# Patient Record
Sex: MALE | Race: WHITE
[De-identification: names, ages, dates, MRNs, and addresses within clinical notes are randomized per-mention and may not be internally consistent; named-entity substitution may affect disease eponyms.]

---

## 2017-04-12 ENCOUNTER — HOSPITAL ENCOUNTER (OUTPATIENT)
Dept: HOSPITAL 47 - RADCTMAIN | Age: 57
Discharge: HOME | End: 2017-04-12
Payer: COMMERCIAL

## 2017-04-12 DIAGNOSIS — J84.10: Primary | ICD-10-CM

## 2017-04-12 PROCEDURE — 71250 CT THORAX DX C-: CPT

## 2017-04-12 NOTE — CT
EXAMINATION TYPE: CT chest wo con

 

DATE OF EXAM: 4/12/2017 7:50 AM

 

COMPARISON: CT chest July 8, 2016. Older CT December 1, 2015.

 

HISTORY: Prior abnormal CT. Known fibrosis.

 

CT DLP: 770 mGycm.  Automated Exposure Control for Dose Reduction was Utilized.

 

 

TECHNIQUE:  CT scan of the thorax is performed without IV contrast.

 

FINDINGS:

 

LUNGS: There is subpleural fibrosis and reticulation seen bilaterally and diffusely involving upper a
nd lower lungs. Some honeycombing along the periphery is present. Overall I see no significant progre
ssion from December 2015 exam. No suspicious groundglass opacity or consolidation is seen to suggest 
acute process. No pleural effusion or pneumothorax is noted bilaterally. No concerning parenchymal no
dule or mass is identified. No significant bronchiectasis is present.

 

MEDIASTINUM: Lack of IV contrast is noted to limit evaluation for mediastinal and especially hilar ad
enopathy. There are no definitive greater than 1 cm hilar or mediastinal lymph nodes.   No cardiomega
ly or pericardial effusion is seen. Main pulmonary artery appears prominent at 3.7 cm on axial image 
18 but stable from prior. Adjacent ascending aorta measures 3.5 cm in diameter. CT finding is consist
ent with underlying pulmonary artery hypertension.

 

OTHER: Liver is diffusely low dense consistent with fatty infiltration. Mild to moderate multilevel a
nterior and lateral spurring is present. There is vacuum disc phenomenon in the lower thoracic levels
 noted.

 

IMPRESSION: Bilateral interstitial fibrosis redemonstrated involving upper and lower lungs raises con
cern for IPF. No significant progression from December 2015. No acute pulmonary process identified.

## 2018-04-09 ENCOUNTER — HOSPITAL ENCOUNTER (OUTPATIENT)
Dept: HOSPITAL 47 - RADCTMAIN | Age: 58
Discharge: HOME | End: 2018-04-09
Payer: COMMERCIAL

## 2018-04-09 DIAGNOSIS — J84.9: Primary | ICD-10-CM

## 2018-04-09 PROCEDURE — 71250 CT THORAX DX C-: CPT

## 2018-04-09 NOTE — CT
EXAMINATION TYPE: CT chest wo con

 

DATE OF EXAM: 4/9/2018

 

COMPARISON: Prior CT chest 4/12/2017

 

HISTORY: Interstitial pulmonary disease

 

CT DLP: 684.6 mGycm.  Automated Exposure Control for Dose Reduction was Utilized.

 

 

TECHNIQUE:  CT scan of the thorax is performed without IV contrast.

 

FINDINGS: Lack of contrast could compromise sensitivity.

 

The pulmonary artery is dilated similar to prior exam.

 

LUNGS: The lungs are similar in appearance, thickening of interlobular septa again noted bilaterally,
 some minimal honeycombing suspected at the lung bases, thickened septal lines are present, there is 
no concerning parenchymal mass or nodule identified. Findings are largely peripheral, subpleural and 
bilateral. Distribution is somewhat patchy, some areas of groundglass opacity also present. There is 
no pleural effusion or pneumothorax seen.  The tracheobronchial tree is patent.

 

MEDIASTINUM: Lack of IV contrast is noted to limit evaluation for mediastinal and especially hilar ad
enopathy. Retrocaval pretracheal lymph node shows a short axis measurement of 12 to 13 mm similar to 
prior exam.

 

OTHER:  No additional significant interval change is seen.

 

IMPRESSION: Findings compatible with idiopathic pulmonary fibrosis, similar to prior exam. Correlate 
for pulmonary artery hypertension.

## 2019-01-04 ENCOUNTER — HOSPITAL ENCOUNTER (OUTPATIENT)
Dept: HOSPITAL 47 - LABWHC1 | Age: 59
Discharge: HOME | End: 2019-01-04
Attending: INTERNAL MEDICINE
Payer: COMMERCIAL

## 2019-01-04 DIAGNOSIS — J84.9: Primary | ICD-10-CM

## 2019-01-04 LAB
A ALTERNATA IGE QN: <0.1 KU/L
CAT DANDER IGE QN: <0.1 KU/L
COMMON RAGWEED IGE QN: <0.1 KU/L
D FARINAE IGE QN: 0.55 KU/L
DSDNA AB SER QL: NEGATIVE
DSDNA AB TITR SER: <1 IU/ML
RED OAK IGE QN: <0.1 KU/L
RED TOP GRASS IGE QN: <0.1 KU/L
RNP: <0.2 AI

## 2019-01-04 PROCEDURE — 86235 NUCLEAR ANTIGEN ANTIBODY: CPT

## 2019-01-04 PROCEDURE — 86225 DNA ANTIBODY NATIVE: CPT

## 2019-01-04 PROCEDURE — 36415 COLL VENOUS BLD VENIPUNCTURE: CPT

## 2019-01-04 PROCEDURE — 86003 ALLG SPEC IGE CRUDE XTRC EA: CPT

## 2019-01-04 PROCEDURE — 86038 ANTINUCLEAR ANTIBODIES: CPT

## 2019-01-04 PROCEDURE — 82785 ASSAY OF IGE: CPT

## 2019-01-04 PROCEDURE — 86431 RHEUMATOID FACTOR QUANT: CPT

## 2020-01-31 ENCOUNTER — HOSPITAL ENCOUNTER (OUTPATIENT)
Dept: HOSPITAL 47 - RADUSWWP | Age: 60
Discharge: HOME | End: 2020-01-31
Attending: FAMILY MEDICINE
Payer: COMMERCIAL

## 2020-01-31 DIAGNOSIS — K22.8: ICD-10-CM

## 2020-01-31 DIAGNOSIS — K21.9: Primary | ICD-10-CM

## 2020-01-31 DIAGNOSIS — K20.9: ICD-10-CM

## 2020-01-31 PROCEDURE — 74220 X-RAY XM ESOPHAGUS 1CNTRST: CPT

## 2020-02-13 ENCOUNTER — HOSPITAL ENCOUNTER (OUTPATIENT)
Dept: HOSPITAL 47 - ORWHC2ENDO | Age: 60
Discharge: HOME | End: 2020-02-13
Attending: INTERNAL MEDICINE
Payer: COMMERCIAL

## 2020-02-13 VITALS — HEART RATE: 69 BPM | SYSTOLIC BLOOD PRESSURE: 107 MMHG | DIASTOLIC BLOOD PRESSURE: 72 MMHG

## 2020-02-13 VITALS — RESPIRATION RATE: 16 BRPM | TEMPERATURE: 98.6 F

## 2020-02-13 VITALS — BODY MASS INDEX: 31.4 KG/M2

## 2020-02-13 DIAGNOSIS — Z76.82: ICD-10-CM

## 2020-02-13 DIAGNOSIS — K21.0: Primary | ICD-10-CM

## 2020-02-13 DIAGNOSIS — Z79.51: ICD-10-CM

## 2020-02-13 DIAGNOSIS — Z98.890: ICD-10-CM

## 2020-02-13 DIAGNOSIS — K29.50: ICD-10-CM

## 2020-02-13 DIAGNOSIS — Z79.899: ICD-10-CM

## 2020-02-13 DIAGNOSIS — I10: ICD-10-CM

## 2020-02-13 DIAGNOSIS — Z87.891: ICD-10-CM

## 2020-02-13 DIAGNOSIS — Z79.82: ICD-10-CM

## 2020-02-13 DIAGNOSIS — J84.10: ICD-10-CM

## 2020-02-13 DIAGNOSIS — E78.5: ICD-10-CM

## 2020-02-13 LAB
ALBUMIN SERPL-MCNC: 4.8 G/DL (ref 3.5–5)
ALP SERPL-CCNC: 80 U/L (ref 38–126)
ALT SERPL-CCNC: 19 U/L (ref 4–49)
AST SERPL-CCNC: 36 U/L (ref 17–59)
BILIRUB INDIRECT SERPL-MCNC: 0.6 MG/DL (ref 0–1.1)
BILIRUBIN DIRECT+TOT PNL SERPL-MCNC: 0.4 MG/DL (ref 0–0.2)
PROT SERPL-MCNC: 8.3 G/DL (ref 6.3–8.2)

## 2020-02-13 PROCEDURE — 43239 EGD BIOPSY SINGLE/MULTIPLE: CPT

## 2020-02-13 PROCEDURE — 88305 TISSUE EXAM BY PATHOLOGIST: CPT

## 2020-02-13 PROCEDURE — 80076 HEPATIC FUNCTION PANEL: CPT

## 2020-02-13 NOTE — P.PCN
Date of Procedure: 02/13/20


Description of Procedure: 





BRIEF HISTORY: 


Patient is a pleasant 59-year-old male who presents for outpatient 

esophagogastroduodenoscopy for evaluation of wall thickening of the esophagus 

seen on imaging and GERD.  Patient reports a history of GERD controlled with 

omeprazole daily.  However, he states that after starting his new medication for

pulmonary fibrosis reflux has worsened.  He reports that this is a known side 

effect of the medication.  Omeprazole was increased to twice daily and he 

reports his symptoms are now improved.





PROCEDURE PERFORMED: 


Esophagogastroduodenoscopy with biopsy.





PREOPERATIVE DIAGNOSIS: 


GERD, wall thickening of the esophagus, abnormal imaging esophagus. 





ESTIMATED BLOOD LOSS: 


Minimal.





IV sedation per anesthesia. 





PROCEDURE: 


After informed consent was obtained, the patient  was brought into the endoscopy

unit. IV sedation was administered by Anesthesia under continuous monitoring. 

Initially the Olympus GIF-190 video endoscope was inserted into the mouth. 

Esophagus intubated without any difficulty. It was gradually advanced into the 

stomach and duodenum and carefully examined. The bulb and the second part of the

duodenum appeared normal, with biopsies taken to rule out celiac sprue. The 

scope at this time was withdrawn to the stomach, adequately insufflated with 

air, and upon careful examination, mucosa of the antrum, body, cardia and the 

fundus appeared normal, except for some mild punctate erythema in the antrum and

body of the stomach suggestive of mild gastritis with biopsies taken. The scope 

was then withdrawn into the esophagus. The GE junction was located at 38 cm from

the incisors with biopsies of the GE junction taken. The esophagus appeared 

normal, with biopsies of the midesophagus taken in the setting of abnormal 

imaging of the esophagus. There were no erosions or ulcerations seen and the 

patient tolerated the procedure well. 





IMPRESSION: 


1.  Gastritis antrum and body, biopsied.


2.  Biopsies of the duodenum, GE junction and mid esophagus.





RECOMMENDATIONS: 


The findings of this examination were discussed with the patient and his wife.  

Okay to resume diet.  Okay to resume medications.  Await pathology from 

biopsies.  Continue omeprazole twice daily.

## 2020-06-03 ENCOUNTER — HOSPITAL ENCOUNTER (OUTPATIENT)
Dept: HOSPITAL 47 - LABWHC1 | Age: 60
Discharge: HOME | End: 2020-06-03
Attending: INTERNAL MEDICINE
Payer: COMMERCIAL

## 2020-06-03 DIAGNOSIS — J84.9: Primary | ICD-10-CM

## 2020-06-03 DIAGNOSIS — J84.112: ICD-10-CM

## 2020-06-03 LAB
ALBUMIN SERPL-MCNC: 4.4 G/DL (ref 3.8–4.9)
ALBUMIN/GLOB SERPL: 1.83 G/DL (ref 1.6–3.17)
ALP SERPL-CCNC: 97 U/L (ref 41–126)
ALT SERPL-CCNC: 18 U/L (ref 10–49)
AST SERPL-CCNC: 30 U/L (ref 14–35)
BILIRUB INDIRECT SERPL-MCNC: 0.4 MG/DL
GLOBULIN SER CALC-MCNC: 2.4 G/DL (ref 1.6–3.3)
PROT SERPL-MCNC: 6.8 G/DL (ref 6.2–8.2)

## 2020-06-03 PROCEDURE — 80076 HEPATIC FUNCTION PANEL: CPT

## 2020-06-03 PROCEDURE — 36415 COLL VENOUS BLD VENIPUNCTURE: CPT

## 2020-07-02 ENCOUNTER — HOSPITAL ENCOUNTER (OUTPATIENT)
Dept: HOSPITAL 47 - LABWHC1 | Age: 60
Discharge: HOME | End: 2020-07-02
Attending: INTERNAL MEDICINE
Payer: COMMERCIAL

## 2020-07-02 DIAGNOSIS — J84.112: Primary | ICD-10-CM

## 2020-09-11 ENCOUNTER — HOSPITAL ENCOUNTER (OUTPATIENT)
Dept: HOSPITAL 47 - LABWHC1 | Age: 60
Discharge: HOME | End: 2020-09-11
Attending: INTERNAL MEDICINE
Payer: COMMERCIAL

## 2020-09-11 DIAGNOSIS — I25.10: ICD-10-CM

## 2020-09-11 DIAGNOSIS — Z01.818: Primary | ICD-10-CM

## 2020-09-11 LAB
ANION GAP SERPL CALC-SCNC: 9 MMOL/L
BUN SERPL-SCNC: 20 MG/DL (ref 9–20)
CALCIUM SPEC-MCNC: 9.6 MG/DL (ref 8.4–10.2)
CHLORIDE SERPL-SCNC: 105 MMOL/L (ref 98–107)
CO2 SERPL-SCNC: 27 MMOL/L (ref 22–30)
ERYTHROCYTE [DISTWIDTH] IN BLOOD BY AUTOMATED COUNT: 4.84 M/UL (ref 4.3–5.9)
ERYTHROCYTE [DISTWIDTH] IN BLOOD: 13.4 % (ref 11.5–15.5)
GLUCOSE SERPL-MCNC: 91 MG/DL (ref 74–99)
HCT VFR BLD AUTO: 45 % (ref 39–53)
HGB BLD-MCNC: 14.6 GM/DL (ref 13–17.5)
INR PPP: 1 (ref ?–1.2)
MCH RBC QN AUTO: 30.3 PG (ref 25–35)
MCHC RBC AUTO-ENTMCNC: 32.6 G/DL (ref 31–37)
MCV RBC AUTO: 93 FL (ref 80–100)
PLATELET # BLD AUTO: 236 K/UL (ref 150–450)
POTASSIUM SERPL-SCNC: 4.4 MMOL/L (ref 3.5–5.1)
PT BLD: 10.6 SEC (ref 9–12)
SODIUM SERPL-SCNC: 141 MMOL/L (ref 137–145)
WBC # BLD AUTO: 6.8 K/UL (ref 3.8–10.6)

## 2020-09-11 PROCEDURE — 85610 PROTHROMBIN TIME: CPT

## 2020-09-11 PROCEDURE — 36415 COLL VENOUS BLD VENIPUNCTURE: CPT

## 2020-09-11 PROCEDURE — 85027 COMPLETE CBC AUTOMATED: CPT

## 2020-09-11 PROCEDURE — 80048 BASIC METABOLIC PNL TOTAL CA: CPT

## 2020-10-02 ENCOUNTER — HOSPITAL ENCOUNTER (OUTPATIENT)
Dept: HOSPITAL 47 - ORWHC2ENDO | Age: 60
Discharge: HOME | End: 2020-10-02
Attending: INTERNAL MEDICINE
Payer: COMMERCIAL

## 2020-10-02 VITALS — BODY MASS INDEX: 29.1 KG/M2

## 2020-10-02 VITALS — DIASTOLIC BLOOD PRESSURE: 69 MMHG | SYSTOLIC BLOOD PRESSURE: 115 MMHG

## 2020-10-02 VITALS — TEMPERATURE: 97 F

## 2020-10-02 VITALS — HEART RATE: 80 BPM | RESPIRATION RATE: 18 BRPM

## 2020-10-02 DIAGNOSIS — K21.9: ICD-10-CM

## 2020-10-02 DIAGNOSIS — Z12.11: Primary | ICD-10-CM

## 2020-10-02 DIAGNOSIS — J84.112: ICD-10-CM

## 2020-10-02 DIAGNOSIS — Z98.890: ICD-10-CM

## 2020-10-02 DIAGNOSIS — Z87.19: ICD-10-CM

## 2020-10-02 DIAGNOSIS — Z79.899: ICD-10-CM

## 2020-10-02 DIAGNOSIS — Z79.51: ICD-10-CM

## 2020-10-02 PROCEDURE — 45378 DIAGNOSTIC COLONOSCOPY: CPT

## 2020-10-02 NOTE — P.PCN
Date of Procedure: 10/02/20


Procedure(s) Performed: 


BRIEF HISTORY: Patient is a 59-year-old pleasant white male scheduled for an 

elective colonoscopy as a part of screening for colorectal neoplasia


PROCEDURE PERFORMED: Colonoscopy. 





PREOPERATIVE DIAGNOSIS:  Screening for colon cancer. 





IV sedation per Anesthesia. 





PROCEDURE: After informed consent was obtained, the patient, was brought into 

the endoscopy unit. IV sedation was administered by Anesthesia under continuous 

monitoring.  Digital rectal examination was normal. Initially the Olympus CF-160

flexible video colonoscope was then inserted in the rectum, gradually advanced 

into the cecum without any difficulty. Careful examination was performed as the 

scope was gradually being withdrawn. Ileocecal valve and the appendiceal orifice

were visualized and appeared normal.  Prep was fair.. Mucosa of the cecum, 

ascending colon, transverse colon, descending colon, sigmoid colon, and rectum 

appeared normal. Retroflexion was performed in the rectum and no lesions were 

seen. The patient tolerated the procedure well. 





IMPRESSION: 


Normal-appearing colon from rectum to cecum with no evidence of colorectal 

neoplasia .





RECOMMENDATIONS:  Findings of this examination were discussed with the patient  

as well as his family. he was advised to have a repeat screening colonoscopy in 

10 years.

## 2020-12-21 ENCOUNTER — HOSPITAL ENCOUNTER (INPATIENT)
Dept: HOSPITAL 47 - EC | Age: 60
LOS: 2 days | Discharge: HOME | DRG: 683 | End: 2020-12-23
Payer: COMMERCIAL

## 2020-12-21 DIAGNOSIS — Z79.82: ICD-10-CM

## 2020-12-21 DIAGNOSIS — Z87.891: ICD-10-CM

## 2020-12-21 DIAGNOSIS — J84.112: ICD-10-CM

## 2020-12-21 DIAGNOSIS — E78.5: ICD-10-CM

## 2020-12-21 DIAGNOSIS — N17.9: Primary | ICD-10-CM

## 2020-12-21 DIAGNOSIS — Z79.52: ICD-10-CM

## 2020-12-21 DIAGNOSIS — E86.1: ICD-10-CM

## 2020-12-21 DIAGNOSIS — E87.1: ICD-10-CM

## 2020-12-21 DIAGNOSIS — K21.9: ICD-10-CM

## 2020-12-21 DIAGNOSIS — N18.32: ICD-10-CM

## 2020-12-21 DIAGNOSIS — Z79.899: ICD-10-CM

## 2020-12-21 DIAGNOSIS — T36.8X5A: ICD-10-CM

## 2020-12-21 DIAGNOSIS — Z98.890: ICD-10-CM

## 2020-12-21 DIAGNOSIS — I12.9: ICD-10-CM

## 2020-12-21 DIAGNOSIS — Z94.2: ICD-10-CM

## 2020-12-21 DIAGNOSIS — E87.5: ICD-10-CM

## 2020-12-21 LAB
ANION GAP SERPL CALC-SCNC: 10 MMOL/L
BASOPHILS # BLD AUTO: 0 K/UL (ref 0–0.2)
BASOPHILS NFR BLD AUTO: 0 %
BUN SERPL-SCNC: 43 MG/DL (ref 9–20)
CALCIUM SPEC-MCNC: 9.4 MG/DL (ref 8.4–10.2)
CHLORIDE SERPL-SCNC: 95 MMOL/L (ref 98–107)
CO2 SERPL-SCNC: 24 MMOL/L (ref 22–30)
EOSINOPHIL # BLD AUTO: 0.1 K/UL (ref 0–0.7)
EOSINOPHIL NFR BLD AUTO: 1 %
ERYTHROCYTE [DISTWIDTH] IN BLOOD BY AUTOMATED COUNT: 3.13 M/UL (ref 4.3–5.9)
ERYTHROCYTE [DISTWIDTH] IN BLOOD: 14.2 % (ref 11.5–15.5)
GLUCOSE SERPL-MCNC: 142 MG/DL (ref 74–99)
HCT VFR BLD AUTO: 29.7 % (ref 39–53)
HGB BLD-MCNC: 10 GM/DL (ref 13–17.5)
LYMPHOCYTES # SPEC AUTO: 0.4 K/UL (ref 1–4.8)
LYMPHOCYTES NFR SPEC AUTO: 3 %
MAGNESIUM SPEC-SCNC: 2.2 MG/DL (ref 1.6–2.3)
MCH RBC QN AUTO: 32 PG (ref 25–35)
MCHC RBC AUTO-ENTMCNC: 33.7 G/DL (ref 31–37)
MCV RBC AUTO: 95.1 FL (ref 80–100)
MONOCYTES # BLD AUTO: 0.2 K/UL (ref 0–1)
MONOCYTES NFR BLD AUTO: 1 %
NEUTROPHILS # BLD AUTO: 10.6 K/UL (ref 1.3–7.7)
NEUTROPHILS NFR BLD AUTO: 94 %
PH UR: 5 [PH] (ref 5–8)
PLATELET # BLD AUTO: 372 K/UL (ref 150–450)
POTASSIUM SERPL-SCNC: 6.6 MMOL/L (ref 3.5–5.1)
SODIUM SERPL-SCNC: 129 MMOL/L (ref 137–145)
SP GR UR: 1.01 (ref 1–1.03)
UROBILINOGEN UR QL STRIP: <2 MG/DL (ref ?–2)
WBC # BLD AUTO: 11.2 K/UL (ref 3.8–10.6)

## 2020-12-21 PROCEDURE — 96374 THER/PROPH/DIAG INJ IV PUSH: CPT

## 2020-12-21 PROCEDURE — 81003 URINALYSIS AUTO W/O SCOPE: CPT

## 2020-12-21 PROCEDURE — 80053 COMPREHEN METABOLIC PANEL: CPT

## 2020-12-21 PROCEDURE — 80197 ASSAY OF TACROLIMUS: CPT

## 2020-12-21 PROCEDURE — 96361 HYDRATE IV INFUSION ADD-ON: CPT

## 2020-12-21 PROCEDURE — 83735 ASSAY OF MAGNESIUM: CPT

## 2020-12-21 PROCEDURE — 82955 ASSAY OF G6PD ENZYME: CPT

## 2020-12-21 PROCEDURE — 80048 BASIC METABOLIC PNL TOTAL CA: CPT

## 2020-12-21 PROCEDURE — 76937 US GUIDE VASCULAR ACCESS: CPT

## 2020-12-21 PROCEDURE — 93005 ELECTROCARDIOGRAM TRACING: CPT

## 2020-12-21 PROCEDURE — 99285 EMERGENCY DEPT VISIT HI MDM: CPT

## 2020-12-21 PROCEDURE — 84132 ASSAY OF SERUM POTASSIUM: CPT

## 2020-12-21 PROCEDURE — 84100 ASSAY OF PHOSPHORUS: CPT

## 2020-12-21 PROCEDURE — 36410 VNPNXR 3YR/> PHY/QHP DX/THER: CPT

## 2020-12-21 PROCEDURE — 96375 TX/PRO/DX INJ NEW DRUG ADDON: CPT

## 2020-12-21 PROCEDURE — 85025 COMPLETE CBC W/AUTO DIFF WBC: CPT

## 2020-12-21 PROCEDURE — 36415 COLL VENOUS BLD VENIPUNCTURE: CPT

## 2020-12-21 RX ADMIN — SENNOSIDES SCH MG: 8.6 TABLET, FILM COATED ORAL at 20:37

## 2020-12-21 RX ADMIN — SODIUM POLYSTYRENE SULFONATE SCH GM: 15 SUSPENSION ORAL; RECTAL at 21:26

## 2020-12-21 RX ADMIN — ACETAMINOPHEN SCH MG: 500 TABLET ORAL at 20:36

## 2020-12-21 RX ADMIN — DOCUSATE SODIUM SCH MG: 100 CAPSULE, LIQUID FILLED ORAL at 20:37

## 2020-12-21 RX ADMIN — NYSTATIN SCH UNIT: 100000 SUSPENSION ORAL at 20:38

## 2020-12-21 RX ADMIN — GABAPENTIN SCH MG: 300 CAPSULE ORAL at 20:37

## 2020-12-21 RX ADMIN — SODIUM POLYSTYRENE SULFONATE SCH GM: 15 SUSPENSION ORAL; RECTAL at 15:14

## 2020-12-21 RX ADMIN — CEFAZOLIN SCH MLS/HR: 330 INJECTION, POWDER, FOR SOLUTION INTRAMUSCULAR; INTRAVENOUS at 15:16

## 2020-12-21 RX ADMIN — TACROLIMUS SCH MG: 1 CAPSULE ORAL at 20:35

## 2020-12-21 RX ADMIN — ATORVASTATIN CALCIUM SCH MG: 20 TABLET, FILM COATED ORAL at 20:35

## 2020-12-21 NOTE — ED
General Adult HPI





- General


Chief complaint: Recheck/Abnormal Lab/Rx


Stated complaint: abn labs


Time Seen by Provider: 12/21/20 13:39


Source: patient


Mode of arrival: ambulatory


Limitations: no limitations





- History of Present Illness


Initial comments: 


Dictation was produced using dragon dictation software. please excuse any 

grammatical, word or spelling errors. 





This patient was cared for during a federal and state declared state of 

emergency secondary to Covid 19





Chief Complaint: 60-year-old male with abnormal outpatient lab





History of Present Illness: Is a 60-year-old male he is 2 weeks postop from 

bilateral lung transplant performed at [facility].  Patient states 

he had the procedure done 2 weeks ago.  Patient has history of idiopathic 

pulmonary fibrosis.  Patient states that his postoperative course was benign.  

He has been at home recovering.  He has home health care nurse that checks on 

him regularly.  He had some labs drawn on potassium of 6.2.  Patient states he's

been recovering well.  He does have some mild pain in his chest and back from 

the surgery.  Otherwise she feels well.  Denies any paresthesias or weakness.








The ROS documented in this emergency department record has been reviewed and co

nfirmed by me.  Those systems with pertinent positive or negative responses have

been documented in the HPI.  All other systems are other negative and/or 

noncontributory.








PHYSICAL EXAM:


General Impression: Alert and oriented x3, not in acute distress


HEENT: Normocephalic atraumatic, extra-ocular movements intact, pupils equal and

reactive to light bilaterally, mucous membranes moist.


Cardiovascular: Heart regular rate and rhythm


Chest: Able to complete full sentences, no retractions, no tachypnea, surgical 

sites clean dry and intact


Abdomen: abdomen soft, non-tender, non-distended, no organomegaly


Musculoskeletal: Pulses present and equal in all extremities, no peripheral 

edema


Motor:  no focal deficits noted


Neurological: CN II-XII grossly intact, no focal motor or sensory deficits noted


Skin: Intact with no visualized rashes


Psych: Normal affect and mood





ED course: 60-year-old male presents with potassium of 6.2 drawn on blood tests 

from earlier today.  He is 2 weeks postop from bilateral lung transplant.  Vital

signs upon arrival are within acceptable limits.  Patient is well-appearing at 

bedside.  EKG does not show any changes to suggest hyperkalemia.





EKG interpretation: Ventricular rate 89, normal sinus rhythm,.  Interval 160, 

QRS 92, . No HI prolongation, no QTC prolongation, no ST or T-wave 

changes noted.    Overall, this EKG is unremarkable





Repeat labs were obtained.  Hemoglobin 10.0.  This is probably cyst baseline 

from surgery.  Sodium is 129.  Patient given intravenous fluids.  Discussed him 

6.6 which is consistent with that of a potassium from earlier.  He is touch of 

acute kidney.  Creatinine of 2.07.  Urinalysis unremarkable.  Discussed patient 

case with Dr. Aburto patient's pulmonologist at [facility] at 

233pm.  He is agreeable with patient being admitted to our hospital for 

potassium control.  Case is discussed with Dr. Gatica who requested nephrology 

consultation.  Patient given hyperkalemia cocktail.








- Related Data


                                Home Medications











 Medication  Instructions  Recorded  Confirmed


 


Ascorbic Acid [Vitamin C 250 mg 500 mg PO DAILY 02/11/20 10/02/20





Tablet Chew]   


 


Aspirin 81 mg PO DAILY 02/11/20 10/02/20


 


Atorvastatin [Lipitor] 20 mg PO HS 02/11/20 10/02/20


 


Cholecalciferol [Vitamin D3] 400 unit PO DAILY 02/11/20 10/02/20


 


Cyanocobalamin (Vitamin B-12) 2,000 mcg PO DAILY 02/11/20 10/02/20





[Vitamin B-12]   


 


Fish Oil/Dha/Epa [Fish Oil 1,200 1 each PO DAILY 02/11/20 10/02/20





mg Fish Oil]   


 


Fluticasone/Umeclidin/Vilanter 1 inhalation INHALATION QAM 02/11/20 10/02/20





[Trelegy Ellipta 100-62.5-25]   


 


Gabapentin [Neurontin] 600 mg PO HS 02/11/20 10/02/20


 


Losartan Potassium [Cozaar] 100 mg PO HS 02/11/20 10/02/20


 


Magnesium 500 mg PO DAILY 02/11/20 10/02/20


 


Multivit-Min/FA/Lycopen/Lutein 1 each PO DAILY 02/11/20 10/02/20





[Centrum Silver Men Tablet]   


 


Omeprazole 20 mg PO BID 02/11/20 10/02/20


 


Pirfenidone [Esbriet] 801 mg PO 0730,1400,2000 02/11/20 10/02/20











                                    Allergies











Allergy/AdvReac Type Severity Reaction Status Date / Time


 


No Known Allergies Allergy   Verified 12/21/20 12:56














Review of Systems


ROS Statement: 


Those systems with pertinent positive or pertinent negative responses have been 

documented in the HPI.





ROS Other: All systems not noted in ROS Statement are negative.





Past Medical History


Past Medical History: GERD/Reflux, Hyperlipidemia, Hypertension


Additional Past Medical History / Comment(s): pt states "severe reflux and 

possible ulcerations shown on recent barium swallow",pulmonary fibrosis-possible

 transplant canidate-following with Dr at [facility]-uses O2 at 2L NC at hs and 

prn.Hx of jaw locking open on rt side spontaneously with need for anesthesia to 

close mult times.


History of Any Multi-Drug Resistant Organisms: None Reported


Past Surgical History: Hernia Repair, Orthopedic Surgery


Additional Past Surgical History / Comment(s): lazy eye surgery,arthroscopy to 

knee,umbilical hernia repair w/ mesh, [place]ito lung transplant


Past Anesthesia/Blood Transfusion Reactions: No Reported Reaction


Past Psychological History: No Psychological Hx Reported


Smoking Status: Former smoker


Past Alcohol Use History: None Reported


Past Drug Use History: None Reported





- Past Family History


  ** Mother


Family Medical History: No Reported History





General Exam


Limitations: no limitations





Course


                                   Vital Signs











  12/21/20





  12:56


 


Temperature 98.4 F


 


Pulse Rate 98


 


Respiratory 18





Rate 


 


Blood Pressure 149/94


 


O2 Sat by Pulse 98





Oximetry 














Medical Decision Making





- Lab Data


Result diagrams: 


                                 12/21/20 13:47





                                 12/21/20 13:47


                                   Lab Results











  12/21/20 12/21/20 12/21/20 Range/Units





  13:47 13:47 13:47 


 


WBC  11.2 H    (3.8-10.6)  k/uL


 


RBC  3.13 L    (4.30-5.90)  m/uL


 


Hgb  10.0 L    (13.0-17.5)  gm/dL


 


Hct  29.7 L    (39.0-53.0)  %


 


MCV  95.1    (80.0-100.0)  fL


 


MCH  32.0    (25.0-35.0)  pg


 


MCHC  33.7    (31.0-37.0)  g/dL


 


RDW  14.2    (11.5-15.5)  %


 


Plt Count  372    (150-450)  k/uL


 


MPV  6.3    


 


Neutrophils %  94    %


 


Lymphocytes %  3    %


 


Monocytes %  1    %


 


Eosinophils %  1    %


 


Basophils %  0    %


 


Neutrophils #  10.6 H    (1.3-7.7)  k/uL


 


Lymphocytes #  0.4 L    (1.0-4.8)  k/uL


 


Monocytes #  0.2    (0-1.0)  k/uL


 


Eosinophils #  0.1    (0-0.7)  k/uL


 


Basophils #  0.0    (0-0.2)  k/uL


 


Sodium    129 L  (137-145)  mmol/L


 


Potassium    6.6 H*  (3.5-5.1)  mmol/L


 


Chloride    95 L  ()  mmol/L


 


Carbon Dioxide    24  (22-30)  mmol/L


 


Anion Gap    10  mmol/L


 


BUN    43 H  (9-20)  mg/dL


 


Creatinine    2.07 H  (0.66-1.25)  mg/dL


 


Est GFR (CKD-EPI)AfAm    39  (>60 ml/min/1.73 sqM)  


 


Est GFR (CKD-EPI)NonAf    34  (>60 ml/min/1.73 sqM)  


 


Glucose    142 H  (74-99)  mg/dL


 


Calcium    9.4  (8.4-10.2)  mg/dL


 


Magnesium    2.2  (1.6-2.3)  mg/dL


 


Urine Color   Yellow   


 


Urine Appearance   Clear   (Clear)  


 


Urine pH   5.0   (5.0-8.0)  


 


Ur Specific Gravity   1.015   (1.001-1.035)  


 


Urine Protein   Trace H   (Negative)  


 


Urine Glucose (UA)   Negative   (Negative)  


 


Urine Ketones   Negative   (Negative)  


 


Urine Blood   Negative   (Negative)  


 


Urine Nitrite   Negative   (Negative)  


 


Urine Bilirubin   Negative   (Negative)  


 


Urine Urobilinogen   <2.0   (<2.0)  mg/dL


 


Ur Leukocyte Esterase   Negative   (Negative)  














Disposition


Clinical Impression: 


 Hyperkalemia





Disposition: ADMITTED AS IP TO THIS HOSP


Condition: Fair


Referrals: 


Bobo Ivey DO [Primary Care Provider] - 1-2 days


Decision Time: 14:50

## 2020-12-22 LAB
ANION GAP SERPL CALC-SCNC: 6 MMOL/L
BUN SERPL-SCNC: 36 MG/DL (ref 9–20)
CALCIUM SPEC-MCNC: 9 MG/DL (ref 8.4–10.2)
CHLORIDE SERPL-SCNC: 98 MMOL/L (ref 98–107)
CO2 SERPL-SCNC: 30 MMOL/L (ref 22–30)
GLUCOSE SERPL-MCNC: 88 MG/DL (ref 74–99)
MAGNESIUM SPEC-SCNC: 1.9 MG/DL (ref 1.6–2.3)
POTASSIUM SERPL-SCNC: 5.3 MMOL/L (ref 3.5–5.1)
SODIUM SERPL-SCNC: 134 MMOL/L (ref 137–145)

## 2020-12-22 PROCEDURE — 05HD33Z INSERTION OF INFUSION DEVICE INTO RIGHT CEPHALIC VEIN, PERCUTANEOUS APPROACH: ICD-10-PCS

## 2020-12-22 RX ADMIN — DAPSONE SCH: 25 TABLET ORAL at 13:26

## 2020-12-22 RX ADMIN — TACROLIMUS SCH MG: 1 CAPSULE ORAL at 09:07

## 2020-12-22 RX ADMIN — GABAPENTIN SCH MG: 300 CAPSULE ORAL at 09:06

## 2020-12-22 RX ADMIN — NYSTATIN SCH UNIT: 100000 SUSPENSION ORAL at 13:38

## 2020-12-22 RX ADMIN — THERA TABS SCH EACH: TAB at 09:07

## 2020-12-22 RX ADMIN — CEFAZOLIN SCH: 330 INJECTION, POWDER, FOR SOLUTION INTRAMUSCULAR; INTRAVENOUS at 13:38

## 2020-12-22 RX ADMIN — PANTOPRAZOLE SODIUM SCH MG: 40 TABLET, DELAYED RELEASE ORAL at 09:07

## 2020-12-22 RX ADMIN — NYSTATIN SCH UNIT: 100000 SUSPENSION ORAL at 19:58

## 2020-12-22 RX ADMIN — ACETAMINOPHEN SCH MG: 500 TABLET ORAL at 19:59

## 2020-12-22 RX ADMIN — NYSTATIN SCH UNIT: 100000 SUSPENSION ORAL at 09:08

## 2020-12-22 RX ADMIN — NYSTATIN SCH UNIT: 100000 SUSPENSION ORAL at 18:04

## 2020-12-22 RX ADMIN — Medication SCH UNIT: at 09:06

## 2020-12-22 RX ADMIN — ACETAMINOPHEN SCH MG: 500 TABLET ORAL at 11:18

## 2020-12-22 RX ADMIN — CEFAZOLIN SCH MLS/HR: 330 INJECTION, POWDER, FOR SOLUTION INTRAMUSCULAR; INTRAVENOUS at 01:54

## 2020-12-22 RX ADMIN — DOCUSATE SODIUM SCH MG: 100 CAPSULE, LIQUID FILLED ORAL at 09:07

## 2020-12-22 RX ADMIN — ATORVASTATIN CALCIUM SCH MG: 20 TABLET, FILM COATED ORAL at 20:00

## 2020-12-22 RX ADMIN — CALCIUM CARBONATE (ANTACID) CHEW TAB 500 MG SCH MG: 500 CHEW TAB at 04:43

## 2020-12-22 RX ADMIN — SENNOSIDES SCH MG: 8.6 TABLET, FILM COATED ORAL at 20:00

## 2020-12-22 RX ADMIN — CALCIUM CARBONATE (ANTACID) CHEW TAB 500 MG SCH MG: 500 CHEW TAB at 18:04

## 2020-12-22 RX ADMIN — GABAPENTIN SCH MG: 300 CAPSULE ORAL at 19:59

## 2020-12-22 RX ADMIN — Medication SCH MG: at 18:04

## 2020-12-22 RX ADMIN — DOCUSATE SODIUM SCH MG: 100 CAPSULE, LIQUID FILLED ORAL at 19:59

## 2020-12-22 RX ADMIN — ASPIRIN 81 MG CHEWABLE TABLET SCH MG: 81 TABLET CHEWABLE at 09:06

## 2020-12-22 RX ADMIN — ACETAMINOPHEN SCH: 500 TABLET ORAL at 03:58

## 2020-12-22 RX ADMIN — Medication SCH MG: at 09:07

## 2020-12-22 RX ADMIN — TACROLIMUS SCH MG: 1 CAPSULE ORAL at 20:00

## 2020-12-22 RX ADMIN — CALCIUM CARBONATE (ANTACID) CHEW TAB 500 MG SCH MG: 500 CHEW TAB at 11:18

## 2020-12-22 NOTE — P.NPCON
History of Present Illness





- Reason for Consult


chronic renal failure





- History of Present Illness





Reason for consultation: Chronic kidney disease and hyperkalemia





History of present illness:


Patient is a 60-year-old male seen in renal consultation for chronic kidney 

disease.  Patient has chronic kidney disease stage IIIB.  Baseline creatinine in

the range of 2-2.3.  Patient presented to the hospital due to abnormal labs.  

Patient states he received bilateral lung transplant on 12/03/2020 at [facility].  He was getting blood work done outpatient and was advised to come 

to the hospital due to high potassium.  Patient's potassium level was 6.6 on 

admission which has been medically treated.  It was 5.3 as of this morning.  

Good urine output.  No hematuria or dysuria.  No vomiting or diarrhea.  Patient 

is maintained on Prograf, azathioprine and prednisone for antirejection 

medications.  He was also on Bactrim for infection prophylaxis.  He denies chest

pain or shortness of breath.  No edema.  Denies use of nonsteroidals.  No 

history of diabetes.





Vital signs are stable.


General: The patient appeared well nourished and normally developed. 


HEENT: Head exam is unremarkable. Neck is without jugular venous distension.


LUNGS: Breath sounds decreased.


HEART: Rate and Rhythm are regular. 


ABDOMEN: Soft, nontender.


EXTREMITITES: No edema.





Past Medical History


Past Medical History: GERD/Reflux, Hyperlipidemia, Hypertension


Additional Past Medical History / Comment(s): pt states "severe reflux and 

possible ulcerations shown on recent barium swallow",pulmonary fibrosis-double 

lung transplant 12/2020 .Hx of jaw locking open on rt side spontaneously with 

need for anesthesia to close mult times.


History of Any Multi-Drug Resistant Organisms: None Reported


Past Surgical History: Heart Catheterization, Hernia Repair, Orthopedic Surgery


Additional Past Surgical History / Comment(s): lazy eye surgery,arthroscopy to 

knee,umbilical hernia repair w/ mesh, [place] lung transplant, colonoscopy


Past Anesthesia/Blood Transfusion Reactions: No Reported Reaction


Past Psychological History: No Psychological Hx Reported


Smoking Status: Former smoker


Past Alcohol Use History: None Reported


Additional Past Alcohol Use History / Comment(s): quit smoking 35 yrs ago,smoked

short period


Past Drug Use History: None Reported





- Past Family History


  ** Mother


Family Medical History: No Reported History





Medications and Allergies


                                Home Medications











 Medication  Instructions  Recorded  Confirmed  Type


 


Aspirin 81 mg PO DAILY 02/11/20 12/21/20 History


 


Atorvastatin [Lipitor] 20 mg PO HS 02/11/20 12/21/20 History


 


Gabapentin [Neurontin] 300 mg PO BID 02/11/20 12/21/20 History


 


Multivit-Min/FA/Lycopen/Lutein 1 tab PO DAILY 02/11/20 12/21/20 History





[Centrum Silver Men Tablet]    


 


Omeprazole 20 mg PO DAILY@0700 02/11/20 12/21/20 History


 


Acetaminophen Tab [Tylenol Tab] 1,000 mg PO Q8H 12/21/20 12/21/20 History


 


Calcium Carbonate 500 mg PO AC-TID 12/21/20 12/21/20 History


 


Cholecalciferol (Vitamin D3) 125 mcg PO DAILY@0900 12/21/20 12/21/20 History





[Vitamin D3]    


 


Cytogam Ivig 1 dose IVPB AS DIRECTED 12/21/20 12/21/20 History


 


Docusate [Colace] 100 mg PO BID 12/21/20 12/21/20 History


 


Magnesium Oxide [Mag-Ox] 800 mg PO AC-BID 12/21/20 12/21/20 History


 


Nystatin 500,000 unit PO QID 12/21/20 12/21/20 History


 


Sennosides [Senna] 8.6 - 17.2 mg PO HS 12/21/20 12/21/20 History


 


Sulfamethox-Tmp 400-80Mg [Bactrim 1 tab PO MOWEFR@0900 12/21/20 12/21/20 History





-80 mg]    


 


Tacrolimus [Prograf] 3 mg PO Q12H 12/21/20 12/21/20 History


 


azaTHIOprine [Imuran] 200 mg PO HS 12/21/20 12/21/20 History


 


oxyCODONE HCL [OxyIR] 5 - 10 mg PO Q4-6H PRN 12/21/20 12/21/20 History


 


predniSONE [Deltasone] 20 mg PO DAILY 12/21/20 12/21/20 History


 


traZODone HCL 50 mg PO HS 12/21/20 12/21/20 History


 


valGANciclovir [Valcyte] 900 mg PO DAILY 12/21/20 12/21/20 History








                                    Allergies











Allergy/AdvReac Type Severity Reaction Status Date / Time


 


No Known Allergies Allergy   Verified 12/21/20 15:26














Physical Exam


Vitals: 


                                   Vital Signs











  Temp Pulse Pulse Resp BP BP BP


 


 12/22/20 07:41  97.7 F   92  18    154/88


 


 12/22/20 04:48  98.0 F   90  16   160/85 


 


 12/21/20 21:00  97.3 F L   91  20   158/94 


 


 12/21/20 16:14  97.2 F L   108 H  16   132/80 


 


 12/21/20 15:57   90     


 


 12/21/20 15:36   90     


 


 12/21/20 15:22   90   18  153/95  


 


 12/21/20 12:56  98.4 F  98   18  149/94  














  Pulse Ox


 


 12/22/20 07:41  96


 


 12/22/20 04:48  98


 


 12/21/20 21:00  99


 


 12/21/20 16:14  96


 


 12/21/20 15:57 


 


 12/21/20 15:36 


 


 12/21/20 15:22  98


 


 12/21/20 12:56  98








                                Intake and Output











 12/21/20 12/22/20 12/22/20





 22:59 06:59 14:59


 


Intake Total  720 


 


Output Total 825 1000 


 


Balance -825 -280 


 


Intake:   


 


  Intake, IV Titration  720 





  Amount   


 


    Sodium Chloride 0.9% 1,  720 





    000 ml @ 90 mls/hr IV .   





    Q11H7M [facility] Rx#:[id number]   


 


Output:   


 


  Urine 825 1000 


 


Other:   


 


  Voiding Method Urinal  


 


  Weight 88.451 kg  














Results





- Lab Results


                             Most recent lab results











Calcium  9.0 mg/dL (8.4-10.2)   12/22/20  07:00    


 


Magnesium  1.9 mg/dL (1.6-2.3)   12/22/20  07:00    














                                 12/21/20 13:47





                                 12/22/20 07:00





Assessment and Plan


Plan: 





Assessment:


1.  Chronic kidney disease stage IIIB secondary to nephrosclerosis with baseline

 creatinine in the range of 2-2.3.  UA fairly benign.  No evidence of retention.


2.  Hyperkalemia secondary to chronic kidney disease, Prograf and Bactrim.  

Improved with medical management.


3.  Status post lung transplant on 12/03/2020 due to IPF.


4.  Hypovolemic hyponatremia improved with IV hydration.





Plan:


Hep-Lock IV fluids.


Case discussed with [facility] transplant team.  Will hold Bactrim. 

 Alternative to Bactrim would be dapsone.  However need to rule out G6PD 

deficiency before starting dapsone.


Repeat potassium level at noon.  If stable, he can be discharged and follow up 

with [facility].


Check G6PD level.


Check prograf level - target level 10-14 for 1st 6 months post-lung transplant 

per [facility]. 


Avoid nephrotoxins.


Low potassium diet.





Thank you for the consultation.  I will continue to follow the patient with you 

during his hospital stay.

## 2020-12-22 NOTE — P.HPIM
History of Present Illness


H&P Date: 12/22/20


Chief Complaint: Abnormal labs





This is a 60-year-old male who underwent bilateral lung transplant 2 weeks ago. 

At [facility], she he has a history of IPF, postoperative 

course has been unremarkable, routine labs 2 weeks noted to have a hyper kalemia

and acute renal injury patient has been admitted into the hospital with 

infectious disease as well as the renal on consult, patient in addition to home 

medicine has been on Prograf as well as Bactrim thought to be involved and acute

kidney injury and hyperkalemia, on specific questioning he denies any chest pain

shortness of breath denies any cough or sputum production, wounds thoracic have 

been healing well, denies any problem with passing urine hematuria, denies any 

flank pain 





Review of Systems


All systems: negative





Past Medical History


Past Medical History: GERD/Reflux, Hyperlipidemia, Hypertension


Additional Past Medical History / Comment(s): pt states "severe reflux and 

possible ulcerations shown on recent barium swallow",pulmonary fibrosis-double 

lung transplant 12/2020 .Hx of jaw locking open on rt side spontaneously with 

need for anesthesia to close mult times.


History of Any Multi-Drug Resistant Organisms: None Reported


Past Surgical History: Heart Catheterization, Hernia Repair, Orthopedic Surgery


Additional Past Surgical History / Comment(s): lazy eye surgery,arthroscopy to 

knee,umbilical hernia repair w/ mesh, torito lung transplant, colonoscopy


Past Anesthesia/Blood Transfusion Reactions: No Reported Reaction


Past Psychological History: No Psychological Hx Reported


Smoking Status: Former smoker


Past Alcohol Use History: None Reported


Additional Past Alcohol Use History / Comment(s): quit smoking 35 yrs ago,smoked

short period


Past Drug Use History: None Reported





- Past Family History


  ** Mother


Family Medical History: No Reported History





Medications and Allergies


                                Home Medications











 Medication  Instructions  Recorded  Confirmed  Type


 


Aspirin 81 mg PO DAILY 02/11/20 12/21/20 History


 


Atorvastatin [Lipitor] 20 mg PO HS 02/11/20 12/21/20 History


 


Gabapentin [Neurontin] 300 mg PO BID 02/11/20 12/21/20 History


 


Multivit-Min/FA/Lycopen/Lutein 1 tab PO DAILY 02/11/20 12/21/20 History





[Centrum Silver Men Tablet]    


 


Omeprazole 20 mg PO DAILY@0700 02/11/20 12/21/20 History


 


Acetaminophen Tab [Tylenol Tab] 1,000 mg PO Q8H 12/21/20 12/21/20 History


 


Calcium Carbonate 500 mg PO AC-TID 12/21/20 12/21/20 History


 


Cholecalciferol (Vitamin D3) 125 mcg PO DAILY@0900 12/21/20 12/21/20 History





[Vitamin D3]    


 


Cytogam Ivig 1 dose IVPB AS DIRECTED 12/21/20 12/21/20 History


 


Docusate [Colace] 100 mg PO BID 12/21/20 12/21/20 History


 


Magnesium Oxide [Mag-Ox] 800 mg PO AC-BID 12/21/20 12/21/20 History


 


Nystatin 500,000 unit PO QID 12/21/20 12/21/20 History


 


Sennosides [Senna] 8.6 - 17.2 mg PO HS 12/21/20 12/21/20 History


 


Sulfamethox-Tmp 400-80Mg [Bactrim 1 tab PO MOWEFR@0900 12/21/20 12/21/20 History





-80 mg]    


 


Tacrolimus [Prograf] 3 mg PO Q12H 12/21/20 12/21/20 History


 


azaTHIOprine [Imuran] 200 mg PO HS 12/21/20 12/21/20 History


 


oxyCODONE HCL [OxyIR] 5 - 10 mg PO Q4-6H PRN 12/21/20 12/21/20 History


 


predniSONE [Deltasone] 20 mg PO DAILY 12/21/20 12/21/20 History


 


traZODone HCL 50 mg PO HS 12/21/20 12/21/20 History


 


valGANciclovir [Valcyte] 900 mg PO DAILY 12/21/20 12/21/20 History








                                    Allergies











Allergy/AdvReac Type Severity Reaction Status Date / Time


 


No Known Allergies Allergy   Verified 12/21/20 15:26














Physical Exam


Vitals: 


                                   Vital Signs











  Temp Pulse Pulse Resp BP BP BP


 


 12/22/20 07:41  97.7 F   92  18    154/88


 


 12/22/20 04:48  98.0 F   90  16   160/85 


 


 12/21/20 21:00  97.3 F L   91  20   158/94 


 


 12/21/20 16:14  97.2 F L   108 H  16   132/80 


 


 12/21/20 15:57   90     


 


 12/21/20 15:36   90     


 


 12/21/20 15:22   90   18  153/95  














  Pulse Ox


 


 12/22/20 07:41  96


 


 12/22/20 04:48  98


 


 12/21/20 21:00  99


 


 12/21/20 16:14  96


 


 12/21/20 15:57 


 


 12/21/20 15:36 


 


 12/21/20 15:22  98








                                Intake and Output











 12/21/20 12/22/20 12/22/20





 22:59 06:59 14:59


 


Intake Total  720 


 


Output Total 825 1000 


 


Balance -825 -280 


 


Intake:   


 


  Intake, IV Titration  720 





  Amount   


 


    Sodium Chloride 0.9% 1,  720 





    000 ml @ 90 mls/hr IV .   





    Q11H7M [facility] Rx#:[id number]   


 


Output:   


 


  Urine 825 1000 


 


Other:   


 


  Voiding Method Urinal  


 


  Weight 88.451 kg  














- Constitutional


General appearance: average body habitus





- EENT


Eyes: fundus normal


Ears: bilateral: normal





- Neck


Neck: normal ROM


Carotids: bilateral: upstroke normal


Thyroid: bilateral: normal size





- Respiratory





Thoracic incision healing well


Respiratory: bilateral: CTA





- Cardiovascular


Rhythm: regular


Heart sounds: normal: S1, S2





- Gastrointestinal


General gastrointestinal: normal bowel sounds, soft





- Integumentary


Integumentary: normal turgor





- Neurologic


Neurologic: CNII-XII intact





- Musculoskeletal


Musculoskeletal: gait normal, strength equal bilaterally





- Psychiatric


Psychiatric: A&O x's 3, appropriate affect, intact judgment & insight





Results


CBC & Chem 7: 


                                 12/21/20 13:47





                                 12/22/20 11:47


Labs: 


                  Abnormal Lab Results - Last 24 Hours (Table)











  12/21/20 12/21/20 12/21/20 Range/Units





  13:47 13:47 16:00 


 


Sodium   129 L   (137-145)  mmol/L


 


Potassium   6.6 H*  6.4 H*  (3.5-5.1)  mmol/L


 


Chloride   95 L   ()  mmol/L


 


BUN   43 H   (9-20)  mg/dL


 


Creatinine   2.07 H   (0.66-1.25)  mg/dL


 


Glucose   142 H   (74-99)  mg/dL


 


Urine Protein  Trace H    (Negative)  














  12/21/20 12/21/20 12/22/20 Range/Units





  18:56 22:48 07:00 


 


Sodium    134 L  (137-145)  mmol/L


 


Potassium  6.2 H*  5.5 H  5.3 H  (3.5-5.1)  mmol/L


 


Chloride     ()  mmol/L


 


BUN    36 H  (9-20)  mg/dL


 


Creatinine    2.16 H  (0.66-1.25)  mg/dL


 


Glucose     (74-99)  mg/dL


 


Urine Protein     (Negative)  














  12/22/20 Range/Units





  11:47 


 


Sodium   (137-145)  mmol/L


 


Potassium  5.5 H  (3.5-5.1)  mmol/L


 


Chloride   ()  mmol/L


 


BUN   (9-20)  mg/dL


 


Creatinine   (0.66-1.25)  mg/dL


 


Glucose   (74-99)  mg/dL


 


Urine Protein   (Negative)  











Comments: 





EKG revealed sinus rhythm with mild LVH





Thrombosis Risk Factor Assmnt





- Choose All That Apply


Any of the Below Risk Factors Present?: Yes


Each Factor Represents 1 point: Age 41-60 years, Serious lung disease incl. pne

umonia (< 1month)


Each Risk Factor Represents 5 Points: Major surgery lasting over 3 hours


Thrombosis Risk Factor Assessment Total Risk Factor Score: 7


Thrombosis Risk Factor Assessment Level: High Risk





Assessment and Plan


Assessment: 





Acute kidney injury on chronic kidney injury





Hyperkalemia





Status post bilateral lung transplant due to IPF





Hypovolemic hyponatremia


Plan: 





Plan includes to hold the Bactrim, gentle rehydration has been finished, repeat 

potassium levels are pending, anticipated discharge in next 24 hours options 

presented changing Bactrim to dapsone patient declined he would still like to 

hold it for now


Time with Patient: Greater than 30

## 2020-12-22 NOTE — P.CONS
History of Present Illness





- Reason for Consult


Consult date: 12/22/20


pcp propylaxis


Requesting physician: Dante Gatica





- Chief Complaint


abnormal labs x 1 day





- History of Present Illness


Patient is a 68-year-old  male in this patient who is a status post 

bilateral lung transplant completed at [facility] on 12/3/2020 

patient said he was discharged home day 8 of his surgery patient is currently 

being maintained on Prograf and azathioprine prednisone and Bactrim DS 3 times a

week for PCP prophylaxis, patient did have history of chronic kidney disease 

with a baseline creatinine of 2.3 patient did have outpatient blood work done 

and he was noticed to have potassium of 6.6 for the patient has been sent to the

ER for further evaluation, patient currently denies having any fever or any 

chills, patient denies having any chest pain or shortness of breath or cough no 

nausea no vomiting no abdominal pain no diarrhea and no urinary symptoms, 

infectious disease was consulted as his Bactrim was discontinued and to 

recommend different prophylactic treatment for PCP prophylaxis.








Review of Systems


Positive point has been mentioned in HPI rest of the systems are negative











Past Medical History


Past Medical History: GERD/Reflux, Hyperlipidemia, Hypertension


Additional Past Medical History / Comment(s): pt states "severe reflux and 

possible ulcerations shown on recent barium swallow",pulmonary fibrosis-double 

lung transplant 12/2020 .Hx of jaw locking open on rt side spontaneously with 

need for anesthesia to close mult times.


History of Any Multi-Drug Resistant Organisms: None Reported


Past Surgical History: Heart Catheterization, Hernia Repair, Orthopedic Surgery


Additional Past Surgical History / Comment(s): lazy eye surgery,arthroscopy to 

knee,umbilical hernia repair w/ mesh, torito lung transplant, colonoscopy


Past Anesthesia/Blood Transfusion Reactions: No Reported Reaction


Past Psychological History: No Psychological Hx Reported


Smoking Status: Former smoker


Past Alcohol Use History: None Reported


Additional Past Alcohol Use History / Comment(s): quit smoking 35 yrs ago,smoked

short period


Past Drug Use History: None Reported





- Past Family History


  ** Mother


Family Medical History: No Reported History





Medications and Allergies


                                Home Medications











 Medication  Instructions  Recorded  Confirmed  Type


 


Aspirin 81 mg PO DAILY 02/11/20 12/21/20 History


 


Atorvastatin [Lipitor] 20 mg PO HS 02/11/20 12/21/20 History


 


Gabapentin [Neurontin] 300 mg PO BID 02/11/20 12/21/20 History


 


Multivit-Min/FA/Lycopen/Lutein 1 tab PO DAILY 02/11/20 12/21/20 History





[Centrum Silver Men Tablet]    


 


Omeprazole 20 mg PO DAILY@0700 02/11/20 12/21/20 History


 


Acetaminophen Tab [Tylenol Tab] 1,000 mg PO Q8H 12/21/20 12/21/20 History


 


Calcium Carbonate 500 mg PO AC-TID 12/21/20 12/21/20 History


 


Cholecalciferol (Vitamin D3) 125 mcg PO DAILY@0900 12/21/20 12/21/20 History





[Vitamin D3]    


 


Cytogam Ivig 1 dose IVPB AS DIRECTED 12/21/20 12/21/20 History


 


Docusate [Colace] 100 mg PO BID 12/21/20 12/21/20 History


 


Magnesium Oxide [Mag-Ox] 800 mg PO AC-BID 12/21/20 12/21/20 History


 


Nystatin 500,000 unit PO QID 12/21/20 12/21/20 History


 


Sennosides [Senna] 8.6 - 17.2 mg PO HS 12/21/20 12/21/20 History


 


Sulfamethox-Tmp 400-80Mg [Bactrim 1 tab PO MOWEFR@0900 12/21/20 12/21/20 History





-80 mg]    


 


Tacrolimus [Prograf] 3 mg PO Q12H 12/21/20 12/21/20 History


 


azaTHIOprine [Imuran] 200 mg PO HS 12/21/20 12/21/20 History


 


oxyCODONE HCL [OxyIR] 5 - 10 mg PO Q4-6H PRN 12/21/20 12/21/20 History


 


predniSONE [Deltasone] 20 mg PO DAILY 12/21/20 12/21/20 History


 


traZODone HCL 50 mg PO HS 12/21/20 12/21/20 History


 


valGANciclovir [Valcyte] 900 mg PO DAILY 12/21/20 12/21/20 History








                                    Allergies











Allergy/AdvReac Type Severity Reaction Status Date / Time


 


No Known Allergies Allergy   Verified 12/21/20 15:26














Physical Exam


Vitals: 


                                   Vital Signs











  Temp Pulse Resp BP BP Pulse Ox


 


 12/22/20 14:00  97.5 F L  94  18   119/85  100


 


 12/22/20 07:41  97.7 F  92  18   154/88  96


 


 12/22/20 04:48  98.0 F  90  16  160/85   98


 


 12/21/20 21:00  97.3 F L  91  20  158/94   99








                                Intake and Output











 12/22/20 12/22/20 12/22/20





 06:59 14:59 22:59


 


Intake Total 720  


 


Output Total 1000  


 


Balance -280  


 


Intake:   


 


  Intake, IV Titration 720  





  Amount   


 


    Sodium Chloride 0.9% 1, 720  





    000 ml @ 90 mls/hr IV .   





    Q11H7M [facility] Rx#:[id number]   


 


Output:   


 


  Urine 1000  











GENERAL DESCRIPTION: Middle-aged male lying in bed, no distress. No tachypnea or

 accessory muscle of respiration use.


HEENT: Shows Pallor , no scleral icterus. Oral mucous membrane is dry.


NECK: Trachea central, no thyromegaly.


LUNGS: Unlabored breathing. Clear to auscultation anteriorly.  Surgical incision

 is healed 


HEART: S1, S2, regular rate and rhythm.


ABDOMEN: Soft, no tenderness , guarding or rigidity


EXTREMITIES: No edema of feet.


SKIN: No rash, no masses palpable.


NEUROLOGICAL: The patient is awake, alert, oriented x3, mood and affect normal.














Results


CBC & Chem 7: 


                                 12/21/20 13:47





                                 12/22/20 18:45


Labs: 


                  Abnormal Lab Results - Last 24 Hours (Table)











  12/21/20 12/21/20 12/22/20 Range/Units





  18:56 22:48 07:00 


 


Sodium    134 L  (137-145)  mmol/L


 


Potassium  6.2 H*  5.5 H  5.3 H  (3.5-5.1)  mmol/L


 


BUN    36 H  (9-20)  mg/dL


 


Creatinine    2.16 H  (0.66-1.25)  mg/dL














  12/22/20 Range/Units





  11:47 


 


Sodium   (137-145)  mmol/L


 


Potassium  5.5 H  (3.5-5.1)  mmol/L


 


BUN   (9-20)  mg/dL


 


Creatinine   (0.66-1.25)  mg/dL














Assessment and Plan


Assessment: 


-patient is a 60-year-old  male status post double lung transplant in 

this patient currently on antirejection medication also on Bactrim for PCP 

prophylaxis in this patient apparently did have a significant hyperkalemia 

attributed to Bactrim which has been discontinued, patient is currently afebrile

 and no obvious focus of infection at this point











Plan: 


1-Bactrim discontinued


2-we will check G6PD deficiency


3-start the patient on dapsone 100 mg daily, this has been discussed in detail 

with his transplant team at [facility]


We will follow on clinical condition and cultures to further adjust medication 

if needed


Thank you for this consultation we will follow the patient along with you





Time with Patient: Greater than 30

## 2020-12-23 VITALS — SYSTOLIC BLOOD PRESSURE: 144 MMHG | DIASTOLIC BLOOD PRESSURE: 95 MMHG | TEMPERATURE: 97.4 F

## 2020-12-23 VITALS — RESPIRATION RATE: 17 BRPM

## 2020-12-23 VITALS — HEART RATE: 94 BPM

## 2020-12-23 LAB
ANION GAP SERPL CALC-SCNC: 9 MMOL/L
BUN SERPL-SCNC: 32 MG/DL (ref 9–20)
CALCIUM SPEC-MCNC: 9.4 MG/DL (ref 8.4–10.2)
CHLORIDE SERPL-SCNC: 95 MMOL/L (ref 98–107)
CO2 SERPL-SCNC: 29 MMOL/L (ref 22–30)
GLUCOSE SERPL-MCNC: 180 MG/DL (ref 74–99)
MAGNESIUM SPEC-SCNC: 1.7 MG/DL (ref 1.6–2.3)
POTASSIUM SERPL-SCNC: 4.4 MMOL/L (ref 3.5–5.1)
SODIUM SERPL-SCNC: 133 MMOL/L (ref 137–145)

## 2020-12-23 RX ADMIN — ACETAMINOPHEN SCH MG: 500 TABLET ORAL at 11:13

## 2020-12-23 RX ADMIN — CEFAZOLIN SCH: 330 INJECTION, POWDER, FOR SOLUTION INTRAMUSCULAR; INTRAVENOUS at 01:46

## 2020-12-23 RX ADMIN — CEFAZOLIN SCH: 330 INJECTION, POWDER, FOR SOLUTION INTRAMUSCULAR; INTRAVENOUS at 11:14

## 2020-12-23 RX ADMIN — ACETAMINOPHEN SCH MG: 500 TABLET ORAL at 02:24

## 2020-12-23 RX ADMIN — CALCIUM CARBONATE (ANTACID) CHEW TAB 500 MG SCH MG: 500 CHEW TAB at 03:35

## 2020-12-23 RX ADMIN — THERA TABS SCH EACH: TAB at 08:49

## 2020-12-23 RX ADMIN — DOCUSATE SODIUM SCH MG: 100 CAPSULE, LIQUID FILLED ORAL at 08:49

## 2020-12-23 RX ADMIN — PANTOPRAZOLE SODIUM SCH MG: 40 TABLET, DELAYED RELEASE ORAL at 07:36

## 2020-12-23 RX ADMIN — NYSTATIN SCH UNIT: 100000 SUSPENSION ORAL at 08:51

## 2020-12-23 RX ADMIN — NYSTATIN SCH UNIT: 100000 SUSPENSION ORAL at 12:13

## 2020-12-23 RX ADMIN — Medication SCH UNIT: at 08:49

## 2020-12-23 RX ADMIN — TACROLIMUS SCH MG: 1 CAPSULE ORAL at 07:36

## 2020-12-23 RX ADMIN — Medication SCH MG: at 07:36

## 2020-12-23 RX ADMIN — CALCIUM CARBONATE (ANTACID) CHEW TAB 500 MG SCH MG: 500 CHEW TAB at 11:14

## 2020-12-23 RX ADMIN — ASPIRIN 81 MG CHEWABLE TABLET SCH MG: 81 TABLET CHEWABLE at 08:49

## 2020-12-23 RX ADMIN — DAPSONE SCH MG: 25 TABLET ORAL at 08:50

## 2020-12-23 RX ADMIN — GABAPENTIN SCH MG: 300 CAPSULE ORAL at 08:49

## 2020-12-23 NOTE — PN
PROGRESS NOTE



DATE OF SERVICE:

12/23/2020



REASON FOR FOLLOWUP:

Post lung transplant, PCP prophylaxis.



INTERVAL HISTORY:

The patient is currently afebrile. Patient is breathing comfortably.  Left-sided upper

chest pain.  Denies having any cough with sputum production.  No nausea, no vomiting.

No abdominal pain or diarrhea.



PHYSICAL EXAMINATION:

Blood pressure is 144/95 with the pulse of 95, temperature 97.4.  He is 98% on room

air.

General description is a middle-aged male up in the chair in no distress.

RESPIRATORY SYSTEM: Unlabored breathing with decreased intensity of breath sounds. No

wheeze.

HEART: S1, S2.  Regular rate and rhythm.

ABDOMEN:  Soft, no tenderness.



LABS:

BUN of 32, creatinine 1.78. Potassium 4.4.



DIAGNOSTIC IMPRESSION AND PLAN:

Patient with bilateral lung transplant. This patient has been on Bactrim for PCP

prophylaxis admitted to the hospital with hyperkalemia and worsening kidney function

related to Bactrim, which has been discontinued.  Discussed in detail with his

transplant team at [facility] recommending a G6PD level which has been

sent.  He did receive a dose of dapsone, however, per discussion with Nephrology, the

transplant team would like G6PD level before they will continue dapsone so will not

recommend any specific probiotics on discharge. All his questions and concerns were

answered.





MMODL / IJN: [id number] / Job#: [id number]

## 2020-12-23 NOTE — P.DS
Providers


Date of admission: 


12/21/20 14:45





Expected date of discharge: 12/23/20


Attending physician: 


Dante Gatica





Consults: 





                                        





12/21/20 14:46


Consult Physician Routine 


   Consulting Provider: Vishnu Hung


   Consult Reason/Comments: madelyn


   Do you want consulting provider notified?: Yes





12/21/20 18:26


Consult Physician Routine 


   Consulting Provider: Liborio Solomon


   Consult Reason/Comments: Alternative to bactrim


   Do you want consulting provider notified?: Yes











Primary care physician: 


Bobo Marinelli [facility] Course: 


12/23/2020, patient seen eval examined during the rounds labs reviewed 

medications reviewed, patient rotation came down to normal level, thought to be 

related to Bactrim is on hold, option present to the patient switching rectum to

dapsone He Is Reluctant to Be Started on that he would rather get it done from 

[facility] patient is now being discharged with follow-up at 

in [facility] transplant unit








This is a 60-year-old male who underwent bilateral lung transplant 2 weeks ago. 

At [facility], she he has a history of IPF, postoperative 

course has been unremarkable, routine labs 2 weeks noted to have a hyper kalemia

and acute renal injury patient has been admitted into the hospital with 

infectious disease as well as the renal on consult, patient in addition to home 

medicine has been on Prograf as well as Bactrim thought to be involved and acute

kidney injury and hyperkalemia, on specific questioning he denies any chest pain

shortness of breath denies any cough or sputum production, wounds thoracic have 

been healing well, denies any problem with passing urine hematuria, denies any 

flank pain 











Assessment: 





Acute kidney injury on chronic kidney injury





Hyperkalemia





Status post bilateral lung transplant due to IPF





Hypovolemic hyponatremia


Patient Condition at Discharge: Good





Plan - Discharge Summary


New Discharge Prescriptions: 


Continue


   Aspirin 81 mg PO DAILY


   Atorvastatin [Lipitor] 20 mg PO HS


   Gabapentin [Neurontin] 300 mg PO BID


   Multivit-Min/FA/Lycopen/Lutein [Centrum Silver Men Tablet] 1 tab PO DAILY


   Omeprazole 20 mg PO DAILY@0700


   traZODone HCL 50 mg PO HS


   Docusate [Colace] 100 mg PO BID


   Acetaminophen Tab [Tylenol] 1,000 mg PO Q8H


   oxyCODONE HCL [OxyIR] 5 - 10 mg PO Q4-6H PRN


     PRN Reason: Pain


   Cholecalciferol (Vitamin D3) [Vitamin D3] 125 mcg PO DAILY@0900


   Calcium Carbonate 500 mg PO AC-TID


   Nystatin 500,000 unit PO QID


   Magnesium Oxide [Mag-Ox] 800 mg PO AC-BID


   Cytogam Ivig 1 dose IVPB AS DIRECTED


   valGANciclovir [Valcyte] 900 mg PO DAILY


   predniSONE [Deltasone] 20 mg PO DAILY


   azaTHIOprine [Imuran] 200 mg PO HS


   Tacrolimus [Prograf] 3 mg PO Q12H





Discontinued


   Sennosides [Senna] 8.6 - 17.2 mg PO HS


   Sulfamethox-Tmp 400-80Mg [Bactrim -80 mg] 1 tab PO MOWEFR@0900


Discharge Medication List





Aspirin 81 mg PO DAILY 02/11/20 [History]


Atorvastatin [Lipitor] 20 mg PO HS 02/11/20 [History]


Gabapentin [Neurontin] 300 mg PO BID 02/11/20 [History]


Multivit-Min/FA/Lycopen/Lutein [Centrum Silver Men Tablet] 1 tab PO DAILY 

02/11/20 [History]


Omeprazole 20 mg PO DAILY@0700 02/11/20 [History]


Acetaminophen Tab [Tylenol] 1,000 mg PO Q8H 12/21/20 [History]


Calcium Carbonate 500 mg PO AC-TID 12/21/20 [History]


Cholecalciferol (Vitamin D3) [Vitamin D3] 125 mcg PO DAILY@0900 12/21/20 

[History]


Cytogam Ivig 1 dose IVPB AS DIRECTED 12/21/20 [History]


Docusate [Colace] 100 mg PO BID 12/21/20 [History]


Magnesium Oxide [Mag-Ox] 800 mg PO AC-BID 12/21/20 [History]


Nystatin 500,000 unit PO QID 12/21/20 [History]


Tacrolimus [Prograf] 3 mg PO Q12H 12/21/20 [History]


azaTHIOprine [Imuran] 200 mg PO HS 12/21/20 [History]


oxyCODONE HCL [OxyIR] 5 - 10 mg PO Q4-6H PRN 12/21/20 [History]


predniSONE [Deltasone] 20 mg PO DAILY 12/21/20 [History]


traZODone HCL 50 mg PO HS 12/21/20 [History]


valGANciclovir [Valcyte] 900 mg PO DAILY 12/21/20 [History]








Follow up Appointment(s)/Referral(s): 


Bobo Ivey DO [Primary Care Provider] - 1-2 days


[facility] Visiting Nurse, [NON-STAFF] - 1 Week


Discharge/Stand Alone Forms:  [facility]

## 2020-12-23 NOTE — P.PN
Subjective





Patient is seen in follow-up for acute kidney injury on chronic kidney disease 

and hyperkalemia.  Patient has chronic kidney disease stage IIIB with baseline 

creatinine near 2.  Renal function and potassium level both improved.  He admits

to good urine output.  He did have an episode of vomiting overnight.  No chest 

pain or shortness of breath.  No edema.





Vital signs are stable.


General: The patient appeared well nourished and normally developed. 


HEENT: Head exam is unremarkable. Neck is without jugular venous distension.


LUNGS: Breath sounds decreased.


HEART: Rate and Rhythm are regular. 


ABDOMEN: Soft, nontender.


EXTREMITITES: No edema.





Objective





- Vital Signs


Vital signs: 


                                   Vital Signs











Temp  97.4 F L  12/23/20 07:00


 


Pulse  94   12/23/20 07:40


 


Resp  17   12/23/20 07:40


 


BP  144/95   12/23/20 07:00


 


Pulse Ox  98   12/23/20 07:00








                                 Intake & Output











 12/22/20 12/23/20 12/23/20





 18:59 06:59 18:59


 


Intake Total 540 250 


 


Output Total  600 250


 


Balance 540 -350 -250


 


Intake:   


 


  Oral 240 250 


 


  Other 300  


 


Output:   


 


  Urine  600 250


 


Other:   


 


  Voiding Method  Urinal Urinal


 


  # Voids  2 2














- Labs


CBC & Chem 7: 


                                 12/21/20 13:47





                                 12/23/20 09:44


Labs: 


                  Abnormal Lab Results - Last 24 Hours (Table)











  12/22/20 12/22/20 12/23/20 Range/Units





  11:47 18:45 09:44 


 


Sodium    133 L  (137-145)  mmol/L


 


Potassium  5.5 H  5.9 H   (3.5-5.1)  mmol/L


 


Chloride    95 L  ()  mmol/L


 


BUN    32 H  (9-20)  mg/dL


 


Creatinine    1.78 H  (0.66-1.25)  mg/dL


 


Glucose    180 H  (74-99)  mg/dL














Assessment and Plan


Plan: 





Assessment:


1.  Chronic kidney disease stage IIIB secondary to nephrosclerosis with baseline

creatinine in the range of 2-2.3.  UA fairly benign.  No evidence of retention. 

Creatinine 1.78 today.


2.  Hyperkalemia secondary to chronic kidney disease, Prograf and Bactrim.  

Improved with medical management.


3.  Status post lung transplant on 12/03/2020 due to IPF.


4.  Hypovolemic hyponatremia improved with IV hydration.  Stable.





Plan:


Remains off IV fluids.


Case discussed with [facility] transplant team.  Will hold Bactrim. 

Alternative to Bactrim would be dapsone.  However need to rule out G6PD 

deficiency before starting dapsone.


Repeat potassium level at noon.  If stable, he can be discharged and follow up 

with [facility].


G6PD level pending.


Prograf level pending - target level 10-14 for 1st 6 months post-lung transplant

per [facility]. 


Avoid nephrotoxins.


Low potassium diet.


Stable to be discharged home from nephrology standpoint.  Patient is scheduled 

for repeat blood work to be done on Monday which will be followed up by he was 

[facility].


Follow-up outpatient in the next 1-2 weeks.


Case also discussed with infectious disease.

## 2021-07-22 ENCOUNTER — HOSPITAL ENCOUNTER (OUTPATIENT)
Dept: HOSPITAL 47 - LABWHC1 | Age: 61
Discharge: HOME | End: 2021-07-22
Attending: NURSE PRACTITIONER
Payer: COMMERCIAL

## 2021-07-22 DIAGNOSIS — R74.8: ICD-10-CM

## 2021-07-22 DIAGNOSIS — E87.5: Primary | ICD-10-CM

## 2021-07-22 PROCEDURE — 84450 TRANSFERASE (AST) (SGOT): CPT

## 2021-07-22 PROCEDURE — 84075 ASSAY ALKALINE PHOSPHATASE: CPT

## 2021-07-22 PROCEDURE — 36415 COLL VENOUS BLD VENIPUNCTURE: CPT

## 2021-07-22 PROCEDURE — 84460 ALANINE AMINO (ALT) (SGPT): CPT

## 2021-07-23 LAB
ALP SERPL-CCNC: 58 U/L (ref 41–126)
ALT SERPL-CCNC: 28 U/L (ref 10–49)
AST SERPL-CCNC: 31 U/L (ref 14–35)

## 2021-07-27 ENCOUNTER — HOSPITAL ENCOUNTER (OUTPATIENT)
Dept: HOSPITAL 47 - LABWHC1 | Age: 61
Discharge: HOME | End: 2021-07-27
Attending: NURSE PRACTITIONER
Payer: COMMERCIAL

## 2021-07-27 DIAGNOSIS — E87.5: ICD-10-CM

## 2021-07-27 DIAGNOSIS — R74.8: ICD-10-CM

## 2021-07-27 DIAGNOSIS — N17.9: Primary | ICD-10-CM

## 2021-07-27 LAB
ALBUMIN SERPL-MCNC: 4 G/DL (ref 3.8–4.9)
ALBUMIN/GLOB SERPL: 1.74 G/DL (ref 1.6–3.17)
ALP SERPL-CCNC: 57 U/L (ref 41–126)
ALT SERPL-CCNC: 22 U/L (ref 10–49)
ANION GAP SERPL CALC-SCNC: 8.7 MMOL/L (ref 4–12)
AST SERPL-CCNC: 30 U/L (ref 14–35)
BASOPHILS # BLD AUTO: 0.04 X 10*3/UL (ref 0–0.1)
BASOPHILS NFR BLD AUTO: 0.6 %
BUN SERPL-SCNC: 45 MG/DL (ref 9–27)
BUN/CREAT SERPL: 17.31 RATIO (ref 12–20)
CALCIUM SPEC-MCNC: 9.3 MG/DL (ref 8.7–10.3)
CHLORIDE SERPL-SCNC: 104 MMOL/L (ref 96–109)
CO2 SERPL-SCNC: 26.3 MMOL/L (ref 21.6–31.8)
EOSINOPHIL # BLD AUTO: 0.02 X 10*3/UL (ref 0.04–0.35)
EOSINOPHIL NFR BLD AUTO: 0.3 %
ERYTHROCYTE [DISTWIDTH] IN BLOOD BY AUTOMATED COUNT: 2.33 X 10*6/UL (ref 4.4–5.6)
ERYTHROCYTE [DISTWIDTH] IN BLOOD: 15 % (ref 11.5–14.5)
GLOBULIN SER CALC-MCNC: 2.3 G/DL (ref 1.6–3.3)
GLUCOSE SERPL-MCNC: 83 MG/DL (ref 70–110)
HCT VFR BLD AUTO: 25.9 % (ref 39.6–50)
HGB BLD-MCNC: 8.1 G/DL (ref 13–17)
HYPOCHROMIA BLD QL SMEAR: (no result)
LYMPHOCYTES # SPEC AUTO: 1.72 X 10*3/UL (ref 0.9–5)
LYMPHOCYTES NFR SPEC AUTO: 26.6 %
MACROCYTES BLD QL SMEAR: (no result)
MCH RBC QN AUTO: 34.8 PG (ref 27–32)
MCHC RBC AUTO-ENTMCNC: 31.3 G/DL (ref 32–37)
MCV RBC AUTO: 111.2 FL (ref 80–97)
MONOCYTES # BLD AUTO: 0.96 X 10*3/UL (ref 0.2–1)
MONOCYTES NFR BLD AUTO: 14.9 %
NEUTROPHILS # BLD AUTO: 3.61 X 10*3/UL (ref 1.8–7.7)
NEUTROPHILS NFR BLD AUTO: 55.9 %
PLATELET # BLD AUTO: 162 X 10*3/UL (ref 140–440)
POTASSIUM SERPL-SCNC: 5 MMOL/L (ref 3.5–5.5)
PROT SERPL-MCNC: 6.3 G/DL (ref 6.2–8.2)
SODIUM SERPL-SCNC: 139 MMOL/L (ref 135–145)
WBC # BLD AUTO: 6.46 X 10*3/UL (ref 4.5–10)

## 2021-07-27 PROCEDURE — 36415 COLL VENOUS BLD VENIPUNCTURE: CPT

## 2021-07-27 PROCEDURE — 85025 COMPLETE CBC W/AUTO DIFF WBC: CPT

## 2021-07-27 PROCEDURE — 80053 COMPREHEN METABOLIC PANEL: CPT

## 2021-09-20 ENCOUNTER — HOSPITAL ENCOUNTER (OUTPATIENT)
Dept: HOSPITAL 47 - LABWHC1 | Age: 61
Discharge: HOME | End: 2021-09-20
Attending: INTERNAL MEDICINE
Payer: COMMERCIAL

## 2021-09-20 DIAGNOSIS — E87.5: Primary | ICD-10-CM

## 2021-09-20 DIAGNOSIS — D84.9: ICD-10-CM

## 2021-09-20 DIAGNOSIS — Z94.2: ICD-10-CM

## 2021-09-20 DIAGNOSIS — R74.8: ICD-10-CM

## 2021-09-20 LAB
ALBUMIN SERPL-MCNC: 4.4 G/DL (ref 3.8–4.9)
ALBUMIN/GLOB SERPL: 2.2 G/DL (ref 1.6–3.17)
ALP SERPL-CCNC: 62 U/L (ref 41–126)
ALT SERPL-CCNC: 13 U/L (ref 10–49)
ANION GAP SERPL CALC-SCNC: 6.9 MMOL/L (ref 4–12)
AST SERPL-CCNC: 20 U/L (ref 14–35)
BUN SERPL-SCNC: 36 MG/DL (ref 9–27)
BUN/CREAT SERPL: 15.65 RATIO (ref 12–20)
CALCIUM SPEC-MCNC: 9.6 MG/DL (ref 8.7–10.3)
CHLORIDE SERPL-SCNC: 104 MMOL/L (ref 96–109)
CO2 SERPL-SCNC: 32.1 MMOL/L (ref 21.6–31.8)
GLOBULIN SER CALC-MCNC: 2 G/DL (ref 1.6–3.3)
GLUCOSE SERPL-MCNC: 82 MG/DL (ref 70–110)
POTASSIUM SERPL-SCNC: 4.7 MMOL/L (ref 3.5–5.5)
PROT SERPL-MCNC: 6.4 G/DL (ref 6.2–8.2)
SODIUM SERPL-SCNC: 143 MMOL/L (ref 135–145)

## 2021-09-20 PROCEDURE — 36415 COLL VENOUS BLD VENIPUNCTURE: CPT

## 2021-09-20 PROCEDURE — 80053 COMPREHEN METABOLIC PANEL: CPT

## 2021-12-13 ENCOUNTER — HOSPITAL ENCOUNTER (OUTPATIENT)
Dept: HOSPITAL 47 - LABWHC1 | Age: 61
Discharge: HOME | End: 2021-12-13
Attending: NURSE PRACTITIONER
Payer: COMMERCIAL

## 2021-12-13 DIAGNOSIS — E78.2: Primary | ICD-10-CM

## 2021-12-13 LAB
ALBUMIN SERPL-MCNC: 4.3 G/DL (ref 3.8–4.9)
ALBUMIN/GLOB SERPL: 1.99 G/DL (ref 1.6–3.17)
ALP SERPL-CCNC: 55 U/L (ref 41–126)
ALT SERPL-CCNC: 11 U/L (ref 10–49)
ANION GAP SERPL CALC-SCNC: 10.5 MMOL/L (ref 10–18)
AST SERPL-CCNC: 22 U/L (ref 14–35)
BASOPHILS # BLD AUTO: 0.02 X 10*3/UL (ref 0–0.1)
BASOPHILS NFR BLD AUTO: 0.4 %
BUN SERPL-SCNC: 30.1 MG/DL (ref 9–27)
BUN/CREAT SERPL: 17.81 RATIO (ref 12–20)
CALCIUM SPEC-MCNC: 9.5 MG/DL (ref 8.7–10.3)
CHLORIDE SERPL-SCNC: 104 MMOL/L (ref 96–109)
CHOLEST SERPL-MCNC: 238 MG/DL (ref 0–200)
CO2 SERPL-SCNC: 25.1 MMOL/L (ref 20–27.5)
EOSINOPHIL # BLD AUTO: 0.03 X 10*3/UL (ref 0.04–0.35)
EOSINOPHIL NFR BLD AUTO: 0.7 %
ERYTHROCYTE [DISTWIDTH] IN BLOOD BY AUTOMATED COUNT: 2.45 X 10*6/UL (ref 4.4–5.6)
ERYTHROCYTE [DISTWIDTH] IN BLOOD: 12.9 % (ref 11.5–14.5)
GLOBULIN SER CALC-MCNC: 2.2 G/DL (ref 1.6–3.3)
GLUCOSE SERPL-MCNC: 88 MG/DL (ref 70–110)
HCT VFR BLD AUTO: 25.7 % (ref 39.6–50)
HDLC SERPL-MCNC: 52.1 MG/DL (ref 40–60)
HGB BLD-MCNC: 8.1 G/DL (ref 13–17)
LDLC SERPL CALC-MCNC: 157.7 MG/DL (ref 0–131)
LYMPHOCYTES # SPEC AUTO: 1.55 X 10*3/UL (ref 0.9–5)
LYMPHOCYTES NFR SPEC AUTO: 33.7 %
MAGNESIUM SPEC-SCNC: 1.8 MG/DL (ref 1.5–2.4)
MCH RBC QN AUTO: 33.1 PG (ref 27–32)
MCHC RBC AUTO-ENTMCNC: 31.5 G/DL (ref 32–37)
MCV RBC AUTO: 104.9 FL (ref 80–97)
MONOCYTES # BLD AUTO: 0.36 X 10*3/UL (ref 0.2–1)
MONOCYTES NFR BLD AUTO: 7.8 %
NEUTROPHILS # BLD AUTO: 2.59 X 10*3/UL (ref 1.8–7.7)
NEUTROPHILS NFR BLD AUTO: 56.3 %
PLATELET # BLD AUTO: 182 X 10*3/UL (ref 140–440)
POTASSIUM SERPL-SCNC: 4.1 MMOL/L (ref 3.5–5.5)
PROT SERPL-MCNC: 6.5 G/DL (ref 6.2–8.2)
SODIUM SERPL-SCNC: 139 MMOL/L (ref 135–145)
TRIGL SERPL-MCNC: 141 MG/DL (ref 0–149)
VLDLC SERPL CALC-MCNC: 28.2 MG/DL (ref 5–40)
WBC # BLD AUTO: 4.6 X 10*3/UL (ref 4.5–10)

## 2021-12-13 PROCEDURE — 80061 LIPID PANEL: CPT

## 2021-12-13 PROCEDURE — 83735 ASSAY OF MAGNESIUM: CPT

## 2021-12-13 PROCEDURE — 36415 COLL VENOUS BLD VENIPUNCTURE: CPT

## 2021-12-13 PROCEDURE — 84100 ASSAY OF PHOSPHORUS: CPT

## 2021-12-13 PROCEDURE — 80053 COMPREHEN METABOLIC PANEL: CPT

## 2021-12-13 PROCEDURE — 85025 COMPLETE CBC W/AUTO DIFF WBC: CPT

## 2022-07-19 ENCOUNTER — HOSPITAL ENCOUNTER (OUTPATIENT)
Dept: HOSPITAL 47 - RADUSWWP | Age: 62
Discharge: HOME | End: 2022-07-19
Attending: FAMILY MEDICINE
Payer: COMMERCIAL

## 2022-07-19 DIAGNOSIS — R25.2: Primary | ICD-10-CM

## 2022-07-19 PROCEDURE — 93922 UPR/L XTREMITY ART 2 LEVELS: CPT

## 2022-07-19 PROCEDURE — 93923 UPR/LXTR ART STDY 3+ LVLS: CPT

## 2022-07-26 NOTE — US
EXAMINATION TYPE: US arterial LE multi level

 

DATE OF EXAM: 7/19/2022 8:53 AM

 

CLINICAL HISTORY: R25.2 LEG CRAMPING. Numbness in toes x 15 years cramping below knees. 

 

Doppler Waveforms: 

Right: Multiphasic

Left: Multiphasic

 

Pulse Volume Recording: Symmetric

 

Pressure Gradients: No significant gradients

 

Ankle-Brachial Indices:

Right: 1.3

Left: 1.34

 

Toe Brachial Indices:

Right: NC

Left: 1.20

 

 

 

IMPRESSION:  Normal ankle-brachial indices

## 2022-08-05 ENCOUNTER — HOSPITAL ENCOUNTER (OUTPATIENT)
Dept: HOSPITAL 47 - LABWHC1 | Age: 62
Discharge: HOME | End: 2022-08-05
Attending: FAMILY MEDICINE
Payer: COMMERCIAL

## 2022-08-05 DIAGNOSIS — R74.8: ICD-10-CM

## 2022-08-05 DIAGNOSIS — G57.93: Primary | ICD-10-CM

## 2022-08-05 DIAGNOSIS — R25.2: ICD-10-CM

## 2022-08-05 LAB
ALBUMIN SERPL-MCNC: 4.8 G/DL (ref 3.8–4.9)
ALBUMIN/GLOB SERPL: 1.92 G/DL (ref 1.6–3.17)
ALP SERPL-CCNC: 70 U/L (ref 41–126)
ALT SERPL-CCNC: 11 U/L (ref 10–49)
ANION GAP SERPL CALC-SCNC: 13.2 MMOL/L (ref 10–18)
AST SERPL-CCNC: 23 U/L (ref 14–35)
BASOPHILS # BLD AUTO: 0.02 X 10*3/UL (ref 0–0.1)
BASOPHILS NFR BLD AUTO: 0.6 %
BUN SERPL-SCNC: 43 MG/DL (ref 9–27)
BUN/CREAT SERPL: 20.48 RATIO (ref 12–20)
CALCIUM SPEC-MCNC: 9.8 MG/DL (ref 8.7–10.3)
CHLORIDE SERPL-SCNC: 102 MMOL/L (ref 96–109)
CHOLEST SERPL-MCNC: 272 MG/DL (ref 0–200)
CO2 SERPL-SCNC: 24.8 MMOL/L (ref 20–27.5)
EOSINOPHIL # BLD AUTO: 0.03 X 10*3/UL (ref 0.04–0.35)
EOSINOPHIL NFR BLD AUTO: 0.9 %
ERYTHROCYTE [DISTWIDTH] IN BLOOD BY AUTOMATED COUNT: 3.42 X 10*6/UL (ref 4.4–5.6)
ERYTHROCYTE [DISTWIDTH] IN BLOOD: 13.3 % (ref 11.5–14.5)
FERRITIN SERPL-MCNC: 90.4 NG/ML (ref 22–322)
GLOBULIN SER CALC-MCNC: 2.5 G/DL (ref 1.6–3.3)
GLUCOSE SERPL-MCNC: 95 MG/DL (ref 70–110)
HCT VFR BLD AUTO: 32.9 % (ref 39.6–50)
HDLC SERPL-MCNC: 54.5 MG/DL (ref 40–60)
HGB BLD-MCNC: 10.3 G/DL (ref 13–17)
IMM GRANULOCYTES BLD QL AUTO: 1.2 %
IRON SERPL-MCNC: 64 UG/DL (ref 65–175)
LDLC SERPL CALC-MCNC: 187.1 MG/DL (ref 0–131)
LYMPHOCYTES # SPEC AUTO: 1.08 X 10*3/UL (ref 0.9–5)
LYMPHOCYTES NFR SPEC AUTO: 31.8 %
MAGNESIUM SPEC-SCNC: 2.2 MG/DL (ref 1.5–2.4)
MCH RBC QN AUTO: 30.1 PG (ref 27–32)
MCHC RBC AUTO-ENTMCNC: 31.3 G/DL (ref 32–37)
MCV RBC AUTO: 96.2 FL (ref 80–97)
MONOCYTES # BLD AUTO: 0.51 X 10*3/UL (ref 0.2–1)
MONOCYTES NFR BLD AUTO: 15 %
NEUTROPHILS # BLD AUTO: 1.72 X 10*3/UL (ref 1.8–7.7)
NEUTROPHILS NFR BLD AUTO: 50.5 %
NRBC BLD AUTO-RTO: 0 /100 WBCS (ref 0–0)
PLATELET # BLD AUTO: 228 X 10*3/UL (ref 140–440)
POTASSIUM SERPL-SCNC: 4.4 MMOL/L (ref 3.5–5.5)
PROT SERPL-MCNC: 7.3 G/DL (ref 6.2–8.2)
PSA SERPL-MCNC: 1 NG/ML (ref 0–4.5)
RBC MORPH BLD: NORMAL
SODIUM SERPL-SCNC: 140 MMOL/L (ref 135–145)
T4 FREE SERPL-MCNC: 1.32 NG/DL (ref 0.8–1.8)
TIBC SERPL-MCNC: 389 UG/DL (ref 228–460)
TRIGL SERPL-MCNC: 152 MG/DL (ref 0–149)
VLDLC SERPL CALC-MCNC: 30.4 MG/DL (ref 5–40)
WBC # BLD AUTO: 3.4 X 10*3/UL (ref 4.5–10)

## 2022-08-05 PROCEDURE — 86038 ANTINUCLEAR ANTIBODIES: CPT

## 2022-08-05 PROCEDURE — 83036 HEMOGLOBIN GLYCOSYLATED A1C: CPT

## 2022-08-05 PROCEDURE — 85025 COMPLETE CBC W/AUTO DIFF WBC: CPT

## 2022-08-05 PROCEDURE — 83550 IRON BINDING TEST: CPT

## 2022-08-05 PROCEDURE — 86140 C-REACTIVE PROTEIN: CPT

## 2022-08-05 PROCEDURE — 84439 ASSAY OF FREE THYROXINE: CPT

## 2022-08-05 PROCEDURE — 36415 COLL VENOUS BLD VENIPUNCTURE: CPT

## 2022-08-05 PROCEDURE — 82728 ASSAY OF FERRITIN: CPT

## 2022-08-05 PROCEDURE — 80061 LIPID PANEL: CPT

## 2022-08-05 PROCEDURE — 84100 ASSAY OF PHOSPHORUS: CPT

## 2022-08-05 PROCEDURE — 83735 ASSAY OF MAGNESIUM: CPT

## 2022-08-05 PROCEDURE — 85652 RBC SED RATE AUTOMATED: CPT

## 2022-08-05 PROCEDURE — 84153 ASSAY OF PSA TOTAL: CPT

## 2022-08-05 PROCEDURE — 84443 ASSAY THYROID STIM HORMONE: CPT

## 2022-08-05 PROCEDURE — 84481 FREE ASSAY (FT-3): CPT

## 2022-08-05 PROCEDURE — 80053 COMPREHEN METABOLIC PANEL: CPT

## 2022-08-05 PROCEDURE — 83540 ASSAY OF IRON: CPT

## 2024-10-07 ENCOUNTER — HOSPITAL ENCOUNTER (OUTPATIENT)
Dept: HOSPITAL 47 - LABWHC1 | Age: 64
Discharge: HOME | End: 2024-10-07
Attending: INTERNAL MEDICINE
Payer: COMMERCIAL

## 2024-10-07 DIAGNOSIS — Z94.2: ICD-10-CM

## 2024-10-07 DIAGNOSIS — Z51.81: Primary | ICD-10-CM

## 2024-10-07 DIAGNOSIS — Z79.899: ICD-10-CM

## 2024-10-07 LAB
ALBUMIN SERPL-MCNC: 4.6 G/DL (ref 3.8–4.9)
ALBUMIN/GLOB SERPL: 2 RATIO (ref 1.6–3.17)
ALP SERPL-CCNC: 71 U/L (ref 41–126)
ALT SERPL-CCNC: 10 U/L (ref 10–49)
ANION GAP SERPL CALC-SCNC: 12.9 MMOL/L (ref 4–12)
AST SERPL-CCNC: 15 U/L (ref 14–35)
BASOPHILS # BLD AUTO: 0.03 X 10*3/UL (ref 0–0.1)
BASOPHILS NFR BLD AUTO: 0.7 %
BUN SERPL-SCNC: 29.7 MG/DL (ref 9–27)
BUN/CREAT SERPL: 17.47 RATIO (ref 12–20)
CALCIUM SPEC-MCNC: 9.2 MG/DL (ref 8.7–10.3)
CHLORIDE SERPL-SCNC: 103 MMOL/L (ref 96–109)
CO2 SERPL-SCNC: 26.1 MMOL/L (ref 21.6–31.8)
EOSINOPHIL # BLD AUTO: 0.07 X 10*3/UL (ref 0.04–0.35)
EOSINOPHIL NFR BLD AUTO: 1.6 %
ERYTHROCYTE [DISTWIDTH] IN BLOOD BY AUTOMATED COUNT: 4.15 X 10*6/UL (ref 4.4–5.6)
ERYTHROCYTE [DISTWIDTH] IN BLOOD: 13.3 % (ref 11.5–14.5)
GLOBULIN SER CALC-MCNC: 2.3 G/DL (ref 1.6–3.3)
GLUCOSE SERPL-MCNC: 94 MG/DL (ref 70–110)
HCT VFR BLD AUTO: 38.2 % (ref 39.6–50)
HGB BLD-MCNC: 11.7 G/DL (ref 13–17)
IMM GRANULOCYTES BLD QL AUTO: 0.5 %
LYMPHOCYTES # SPEC AUTO: 1.24 X 10*3/UL (ref 0.9–5)
LYMPHOCYTES NFR SPEC AUTO: 28.8 %
MAGNESIUM SPEC-SCNC: 1.5 MG/DL (ref 1.5–2.4)
MCH RBC QN AUTO: 28.2 PG (ref 27–32)
MCHC RBC AUTO-ENTMCNC: 30.6 G/DL (ref 32–37)
MCV RBC AUTO: 92 FL (ref 80–97)
MONOCYTES # BLD AUTO: 0.5 X 10*3/UL (ref 0.2–1)
MONOCYTES NFR BLD AUTO: 11.6 %
NEUTROPHILS # BLD AUTO: 2.45 X 10*3/UL (ref 1.8–7.7)
NEUTROPHILS NFR BLD AUTO: 56.8 %
NRBC BLD AUTO-RTO: 0 X 10*3/UL (ref 0–0.01)
PLATELET # BLD AUTO: 218 X 10*3/UL (ref 140–440)
POTASSIUM SERPL-SCNC: 4.1 MMOL/L (ref 3.5–5.5)
PROT SERPL-MCNC: 6.9 G/DL (ref 6.2–8.2)
SODIUM SERPL-SCNC: 142 MMOL/L (ref 135–145)
WBC # BLD AUTO: 4.31 X 10*3/UL (ref 4.5–10)

## 2024-10-07 PROCEDURE — 83735 ASSAY OF MAGNESIUM: CPT

## 2024-10-07 PROCEDURE — 80053 COMPREHEN METABOLIC PANEL: CPT

## 2024-10-07 PROCEDURE — 85025 COMPLETE CBC W/AUTO DIFF WBC: CPT

## 2024-10-07 PROCEDURE — 36415 COLL VENOUS BLD VENIPUNCTURE: CPT

## 2025-02-25 ENCOUNTER — HOSPITAL ENCOUNTER (OUTPATIENT)
Dept: HOSPITAL 47 - LABWHC1 | Age: 65
Discharge: HOME | End: 2025-02-25
Attending: INTERNAL MEDICINE
Payer: COMMERCIAL

## 2025-02-25 DIAGNOSIS — Z51.81: Primary | ICD-10-CM

## 2025-02-25 DIAGNOSIS — Z94.2: ICD-10-CM

## 2025-02-25 LAB
ANION GAP SERPL CALC-SCNC: 11.2 MMOL/L (ref 4–12)
BUN SERPL-SCNC: 24.4 MG/DL (ref 9–27)
BUN/CREAT SERPL: 13.56 RATIO (ref 12–20)
CALCIUM SPEC-MCNC: 8.9 MG/DL (ref 8.7–10.3)
CHLORIDE SERPL-SCNC: 105 MMOL/L (ref 96–109)
CO2 SERPL-SCNC: 24.8 MMOL/L (ref 21.6–31.8)
ERYTHROCYTE [DISTWIDTH] IN BLOOD BY AUTOMATED COUNT: 3.73 X 10*6/UL (ref 4.4–5.6)
ERYTHROCYTE [DISTWIDTH] IN BLOOD: 13 % (ref 11.5–14.5)
GLUCOSE SERPL-MCNC: 121 MG/DL (ref 70–110)
HCT VFR BLD AUTO: 34.7 % (ref 39.6–50)
HGB BLD-MCNC: 10.8 G/DL (ref 13–17)
MCH RBC QN AUTO: 29 PG (ref 27–32)
MCHC RBC AUTO-ENTMCNC: 31.1 G/DL (ref 32–37)
MCV RBC AUTO: 93 FL (ref 80–97)
NRBC BLD AUTO-RTO: 0 X 10*3/UL (ref 0–0.01)
PLATELET # BLD AUTO: 193 X 10*3/UL (ref 140–440)
POTASSIUM SERPL-SCNC: 3.6 MMOL/L (ref 3.5–5.5)
SODIUM SERPL-SCNC: 141 MMOL/L (ref 135–145)
WBC # BLD AUTO: 4.24 X 10*3/UL (ref 4.5–10)

## 2025-02-25 PROCEDURE — 36415 COLL VENOUS BLD VENIPUNCTURE: CPT

## 2025-02-25 PROCEDURE — 80048 BASIC METABOLIC PNL TOTAL CA: CPT

## 2025-02-25 PROCEDURE — 85027 COMPLETE CBC AUTOMATED: CPT
